# Patient Record
Sex: MALE | Race: WHITE | NOT HISPANIC OR LATINO
[De-identification: names, ages, dates, MRNs, and addresses within clinical notes are randomized per-mention and may not be internally consistent; named-entity substitution may affect disease eponyms.]

---

## 2017-04-11 ENCOUNTER — APPOINTMENT (OUTPATIENT)
Dept: DERMATOLOGY | Facility: CLINIC | Age: 82
End: 2017-04-11

## 2017-04-11 VITALS — HEIGHT: 66 IN | BODY MASS INDEX: 23.95 KG/M2 | WEIGHT: 149 LBS

## 2017-04-11 DIAGNOSIS — Z85.828 PERSONAL HISTORY OF OTHER MALIGNANT NEOPLASM OF SKIN: ICD-10-CM

## 2017-04-11 DIAGNOSIS — C64.2 MALIGNANT NEOPLASM OF LEFT KIDNEY, EXCEPT RENAL PELVIS: ICD-10-CM

## 2017-04-11 DIAGNOSIS — Z86.39 PERSONAL HISTORY OF OTHER ENDOCRINE, NUTRITIONAL AND METABOLIC DISEASE: ICD-10-CM

## 2017-04-11 DIAGNOSIS — Z91.89 OTHER SPECIFIED PERSONAL RISK FACTORS, NOT ELSEWHERE CLASSIFIED: ICD-10-CM

## 2017-04-11 DIAGNOSIS — Z87.891 PERSONAL HISTORY OF NICOTINE DEPENDENCE: ICD-10-CM

## 2017-04-11 RX ORDER — RASAGILINE MESYLATE 1 MG/1
1 TABLET ORAL
Refills: 0 | Status: ACTIVE | COMMUNITY

## 2017-04-11 RX ORDER — MIRTAZAPINE 7.5 MG/1
7.5 TABLET, FILM COATED ORAL
Refills: 0 | Status: ACTIVE | COMMUNITY

## 2017-04-11 RX ORDER — SIMVASTATIN 20 MG/1
20 TABLET, FILM COATED ORAL
Refills: 0 | Status: ACTIVE | COMMUNITY

## 2017-04-11 RX ORDER — LEVODOPA AND CARBIDOPA 95; 23.75 MG/1; MG/1
23.75-95 CAPSULE, EXTENDED RELEASE ORAL
Refills: 0 | Status: ACTIVE | COMMUNITY

## 2017-04-11 RX ORDER — MIRTAZAPINE 30 MG/1
30 TABLET, ORALLY DISINTEGRATING ORAL
Refills: 0 | Status: ACTIVE | COMMUNITY

## 2017-04-11 RX ORDER — CHROMIUM 200 MCG
1000 TABLET ORAL
Refills: 0 | Status: ACTIVE | COMMUNITY

## 2017-04-11 RX ORDER — VALACYCLOVIR HYDROCHLORIDE 1 G/1
1 TABLET, FILM COATED ORAL
Refills: 0 | Status: ACTIVE | COMMUNITY

## 2017-04-11 RX ORDER — TAMSULOSIN HYDROCHLORIDE 0.4 MG/1
0.4 CAPSULE ORAL
Refills: 0 | Status: ACTIVE | COMMUNITY

## 2017-04-11 RX ORDER — ASPIRIN 81 MG
81 TABLET, DELAYED RELEASE (ENTERIC COATED) ORAL
Refills: 0 | Status: ACTIVE | COMMUNITY

## 2017-04-11 RX ORDER — DONEPEZIL HYDROCHLORIDE 5 MG/1
5 TABLET, FILM COATED ORAL
Refills: 0 | Status: ACTIVE | COMMUNITY

## 2017-04-11 RX ORDER — LOSARTAN POTASSIUM 50 MG/1
50 TABLET, FILM COATED ORAL
Refills: 0 | Status: ACTIVE | COMMUNITY

## 2017-11-21 ENCOUNTER — EMERGENCY (EMERGENCY)
Facility: HOSPITAL | Age: 82
LOS: 0 days | Discharge: HOME | End: 2017-11-21
Attending: EMERGENCY MEDICINE | Admitting: EMERGENCY MEDICINE
Payer: MEDICARE

## 2017-11-21 VITALS
RESPIRATION RATE: 16 BRPM | OXYGEN SATURATION: 98 % | DIASTOLIC BLOOD PRESSURE: 74 MMHG | SYSTOLIC BLOOD PRESSURE: 135 MMHG | HEART RATE: 62 BPM

## 2017-11-21 VITALS
HEIGHT: 68 IN | DIASTOLIC BLOOD PRESSURE: 85 MMHG | RESPIRATION RATE: 18 BRPM | HEART RATE: 64 BPM | TEMPERATURE: 98 F | OXYGEN SATURATION: 99 % | SYSTOLIC BLOOD PRESSURE: 192 MMHG | WEIGHT: 164.91 LBS

## 2017-11-21 DIAGNOSIS — N40.0 BENIGN PROSTATIC HYPERPLASIA WITHOUT LOWER URINARY TRACT SYMPTOMS: ICD-10-CM

## 2017-11-21 DIAGNOSIS — R94.31 ABNORMAL ELECTROCARDIOGRAM [ECG] [EKG]: ICD-10-CM

## 2017-11-21 DIAGNOSIS — E78.5 HYPERLIPIDEMIA, UNSPECIFIED: ICD-10-CM

## 2017-11-21 DIAGNOSIS — Z87.19 PERSONAL HISTORY OF OTHER DISEASES OF THE DIGESTIVE SYSTEM: ICD-10-CM

## 2017-11-21 DIAGNOSIS — R07.9 CHEST PAIN, UNSPECIFIED: ICD-10-CM

## 2017-11-21 DIAGNOSIS — Z98.890 OTHER SPECIFIED POSTPROCEDURAL STATES: ICD-10-CM

## 2017-11-21 DIAGNOSIS — G20 PARKINSON'S DISEASE: ICD-10-CM

## 2017-11-21 DIAGNOSIS — H26.9 UNSPECIFIED CATARACT: ICD-10-CM

## 2017-11-21 DIAGNOSIS — Z90.5 ACQUIRED ABSENCE OF KIDNEY: ICD-10-CM

## 2017-11-21 DIAGNOSIS — E11.9 TYPE 2 DIABETES MELLITUS WITHOUT COMPLICATIONS: ICD-10-CM

## 2017-11-21 DIAGNOSIS — F43.20 ADJUSTMENT DISORDER, UNSPECIFIED: ICD-10-CM

## 2017-11-21 LAB
ALBUMIN SERPL ELPH-MCNC: 3.9 G/DL — SIGNIFICANT CHANGE UP (ref 3.3–5)
ALP SERPL-CCNC: 103 U/L — SIGNIFICANT CHANGE UP (ref 40–120)
ALT FLD-CCNC: 14 U/L — SIGNIFICANT CHANGE UP (ref 12–78)
ANION GAP SERPL CALC-SCNC: 6 MMOL/L — SIGNIFICANT CHANGE UP (ref 5–17)
AST SERPL-CCNC: 17 U/L — SIGNIFICANT CHANGE UP (ref 15–37)
BASOPHILS # BLD AUTO: 0.1 K/UL — SIGNIFICANT CHANGE UP (ref 0–0.2)
BASOPHILS NFR BLD AUTO: 1.6 % — SIGNIFICANT CHANGE UP (ref 0–2)
BILIRUB SERPL-MCNC: 0.5 MG/DL — SIGNIFICANT CHANGE UP (ref 0.2–1.2)
BUN SERPL-MCNC: 25 MG/DL — HIGH (ref 7–23)
CALCIUM SERPL-MCNC: 9.2 MG/DL — SIGNIFICANT CHANGE UP (ref 8.5–10.1)
CHLORIDE SERPL-SCNC: 104 MMOL/L — SIGNIFICANT CHANGE UP (ref 96–108)
CO2 SERPL-SCNC: 30 MMOL/L — SIGNIFICANT CHANGE UP (ref 22–31)
CREAT SERPL-MCNC: 1.34 MG/DL — HIGH (ref 0.5–1.3)
EOSINOPHIL # BLD AUTO: 0.2 K/UL — SIGNIFICANT CHANGE UP (ref 0–0.5)
EOSINOPHIL NFR BLD AUTO: 3.1 % — SIGNIFICANT CHANGE UP (ref 0–6)
GLUCOSE SERPL-MCNC: 129 MG/DL — HIGH (ref 70–99)
HCT VFR BLD CALC: 38.6 % — LOW (ref 39–50)
HGB BLD-MCNC: 12.5 G/DL — LOW (ref 13–17)
INR BLD: 1.12 RATIO — SIGNIFICANT CHANGE UP (ref 0.88–1.16)
LYMPHOCYTES # BLD AUTO: 1.4 K/UL — SIGNIFICANT CHANGE UP (ref 1–3.3)
LYMPHOCYTES # BLD AUTO: 22.9 % — SIGNIFICANT CHANGE UP (ref 13–44)
MCHC RBC-ENTMCNC: 32.4 GM/DL — SIGNIFICANT CHANGE UP (ref 32–36)
MCHC RBC-ENTMCNC: 33.4 PG — SIGNIFICANT CHANGE UP (ref 27–34)
MCV RBC AUTO: 102.9 FL — HIGH (ref 80–100)
MONOCYTES # BLD AUTO: 0.3 K/UL — SIGNIFICANT CHANGE UP (ref 0–0.9)
MONOCYTES NFR BLD AUTO: 5.3 % — SIGNIFICANT CHANGE UP (ref 2–14)
NEUTROPHILS # BLD AUTO: 4.1 K/UL — SIGNIFICANT CHANGE UP (ref 1.8–7.4)
NEUTROPHILS NFR BLD AUTO: 67.1 % — SIGNIFICANT CHANGE UP (ref 43–77)
PLATELET # BLD AUTO: 137 K/UL — LOW (ref 150–400)
POTASSIUM SERPL-MCNC: 4.6 MMOL/L — SIGNIFICANT CHANGE UP (ref 3.5–5.3)
POTASSIUM SERPL-SCNC: 4.6 MMOL/L — SIGNIFICANT CHANGE UP (ref 3.5–5.3)
PROT SERPL-MCNC: 6.8 GM/DL — SIGNIFICANT CHANGE UP (ref 6–8.3)
PROTHROM AB SERPL-ACNC: 12.1 SEC — SIGNIFICANT CHANGE UP (ref 9.8–12.7)
RBC # BLD: 3.75 M/UL — LOW (ref 4.2–5.8)
RBC # FLD: 10.8 % — SIGNIFICANT CHANGE UP (ref 10.3–14.5)
SODIUM SERPL-SCNC: 140 MMOL/L — SIGNIFICANT CHANGE UP (ref 135–145)
TROPONIN I SERPL-MCNC: <0.015 NG/ML — SIGNIFICANT CHANGE UP (ref 0.01–0.04)
TROPONIN I SERPL-MCNC: <0.015 NG/ML — SIGNIFICANT CHANGE UP (ref 0.01–0.04)
WBC # BLD: 6.1 K/UL — SIGNIFICANT CHANGE UP (ref 3.8–10.5)
WBC # FLD AUTO: 6.1 K/UL — SIGNIFICANT CHANGE UP (ref 3.8–10.5)

## 2017-11-21 PROCEDURE — 99285 EMERGENCY DEPT VISIT HI MDM: CPT | Mod: 25

## 2017-11-21 PROCEDURE — 93010 ELECTROCARDIOGRAM REPORT: CPT

## 2017-11-21 PROCEDURE — 71010: CPT | Mod: 26

## 2017-11-21 RX ORDER — SODIUM CHLORIDE 9 MG/ML
3 INJECTION INTRAMUSCULAR; INTRAVENOUS; SUBCUTANEOUS ONCE
Qty: 0 | Refills: 0 | Status: COMPLETED | OUTPATIENT
Start: 2017-11-21 | End: 2017-11-21

## 2017-11-21 RX ADMIN — SODIUM CHLORIDE 3 MILLILITER(S): 9 INJECTION INTRAMUSCULAR; INTRAVENOUS; SUBCUTANEOUS at 08:55

## 2017-11-21 NOTE — ED PROVIDER NOTE - OBJECTIVE STATEMENT
Pt comes to the Ed complainign of chest pain. Pt states that the pain woke him from sleep and was sharp. As per patient the pain was right sided and also TTP of the chest wall. No cough no fevers. Pt states called 911. En route felt better, no meds given to him. Pt states the pain lasted approx 20 mins. Now only TTP of the right chest wall. No dyspnea.

## 2017-11-21 NOTE — ED PROVIDER NOTE - NS_ ATTENDINGSCRIBEDETAILS _ED_A_ED_FT
I, Kit Clarke MD,  performed the initial face to face bedside interview with this patient regarding history of present illness, review of symptoms and relevant past medical, social and family history.  I completed an independent physical examination.    The history, relevant review of systems, past medical and surgical history, medical decision making, and physical examination was documented by the scribe in my presence and I attest to the accuracy of the documentation.

## 2017-11-21 NOTE — ED ADULT NURSE NOTE - OBJECTIVE STATEMENT
Pt woke up with chest pain with breathing which went away by the time EMS arrived. Pt denies pain now

## 2017-11-21 NOTE — ED PROVIDER NOTE - PSH
Adjustment Reaction with Anxiety and Depression    Bilateral Cataracts    Enlarged Prostate    History of Nephrectomy, Unilateral    S/P Inguinal Hernia Repair

## 2017-12-12 ENCOUNTER — APPOINTMENT (OUTPATIENT)
Dept: DERMATOLOGY | Facility: CLINIC | Age: 82
End: 2017-12-12
Payer: MEDICARE

## 2017-12-12 DIAGNOSIS — D23.9 OTHER BENIGN NEOPLASM OF SKIN, UNSPECIFIED: ICD-10-CM

## 2017-12-12 PROCEDURE — 99213 OFFICE O/P EST LOW 20 MIN: CPT

## 2017-12-12 RX ORDER — PREDNISOLONE SODIUM PHOSPHATE 10 MG/ML
1 SOLUTION/ DROPS OPHTHALMIC
Refills: 0 | Status: COMPLETED | COMMUNITY
End: 2017-12-12

## 2017-12-12 RX ORDER — MIRABEGRON 25 MG/1
25 TABLET, FILM COATED, EXTENDED RELEASE ORAL
Refills: 0 | Status: COMPLETED | COMMUNITY
End: 2017-12-12

## 2017-12-12 RX ORDER — ROPINIROLE 6 MG/1
6 TABLET, FILM COATED, EXTENDED RELEASE ORAL
Refills: 0 | Status: COMPLETED | COMMUNITY
End: 2017-12-12

## 2017-12-12 RX ORDER — PREDNISONE 5 MG
5 TABLET, DOSE PACK ORAL
Refills: 0 | Status: COMPLETED | COMMUNITY
End: 2017-12-12

## 2017-12-12 RX ORDER — LATANOPROST/PF 0.005 %
DROPS OPHTHALMIC (EYE)
Refills: 0 | Status: ACTIVE | COMMUNITY

## 2018-07-25 ENCOUNTER — INPATIENT (INPATIENT)
Facility: HOSPITAL | Age: 83
LOS: 1 days | Discharge: HOME | End: 2018-07-27
Attending: INTERNAL MEDICINE | Admitting: INTERNAL MEDICINE
Payer: MEDICARE

## 2018-07-25 VITALS
DIASTOLIC BLOOD PRESSURE: 82 MMHG | TEMPERATURE: 97 F | HEART RATE: 70 BPM | WEIGHT: 149.91 LBS | SYSTOLIC BLOOD PRESSURE: 192 MMHG | RESPIRATION RATE: 15 BRPM | OXYGEN SATURATION: 98 % | HEIGHT: 67 IN

## 2018-07-25 LAB
ADD ON TEST-SPECIMEN IN LAB: SIGNIFICANT CHANGE UP
ALBUMIN SERPL ELPH-MCNC: 4.1 G/DL — SIGNIFICANT CHANGE UP (ref 3.3–5)
ALP SERPL-CCNC: 112 U/L — SIGNIFICANT CHANGE UP (ref 40–120)
ALT FLD-CCNC: 13 U/L — SIGNIFICANT CHANGE UP (ref 12–78)
AMMONIA BLD-MCNC: 15 UMOL/L — SIGNIFICANT CHANGE UP (ref 11–32)
ANION GAP SERPL CALC-SCNC: 5 MMOL/L — SIGNIFICANT CHANGE UP (ref 5–17)
APPEARANCE UR: CLEAR — SIGNIFICANT CHANGE UP
APTT BLD: 30.1 SEC — SIGNIFICANT CHANGE UP (ref 27.5–37.4)
AST SERPL-CCNC: 17 U/L — SIGNIFICANT CHANGE UP (ref 15–37)
BASOPHILS # BLD AUTO: 0.04 K/UL — SIGNIFICANT CHANGE UP (ref 0–0.2)
BASOPHILS NFR BLD AUTO: 0.8 % — SIGNIFICANT CHANGE UP (ref 0–2)
BILIRUB SERPL-MCNC: 0.5 MG/DL — SIGNIFICANT CHANGE UP (ref 0.2–1.2)
BILIRUB UR-MCNC: NEGATIVE — SIGNIFICANT CHANGE UP
BUN SERPL-MCNC: 27 MG/DL — HIGH (ref 7–23)
CALCIUM SERPL-MCNC: 9.1 MG/DL — SIGNIFICANT CHANGE UP (ref 8.5–10.1)
CHLORIDE SERPL-SCNC: 105 MMOL/L — SIGNIFICANT CHANGE UP (ref 96–108)
CK SERPL-CCNC: 80 U/L — SIGNIFICANT CHANGE UP (ref 26–308)
CO2 SERPL-SCNC: 30 MMOL/L — SIGNIFICANT CHANGE UP (ref 22–31)
COLOR SPEC: YELLOW — SIGNIFICANT CHANGE UP
CREAT SERPL-MCNC: 1.4 MG/DL — HIGH (ref 0.5–1.3)
DIFF PNL FLD: NEGATIVE — SIGNIFICANT CHANGE UP
EOSINOPHIL # BLD AUTO: 0.12 K/UL — SIGNIFICANT CHANGE UP (ref 0–0.5)
EOSINOPHIL NFR BLD AUTO: 2.3 % — SIGNIFICANT CHANGE UP (ref 0–6)
GLUCOSE SERPL-MCNC: 128 MG/DL — HIGH (ref 70–99)
GLUCOSE UR QL: NEGATIVE MG/DL — SIGNIFICANT CHANGE UP
HCT VFR BLD CALC: 41 % — SIGNIFICANT CHANGE UP (ref 39–50)
HGB BLD-MCNC: 13.6 G/DL — SIGNIFICANT CHANGE UP (ref 13–17)
IMM GRANULOCYTES NFR BLD AUTO: 0.2 % — SIGNIFICANT CHANGE UP (ref 0–1.5)
INR BLD: 1.04 RATIO — SIGNIFICANT CHANGE UP (ref 0.88–1.16)
KETONES UR-MCNC: NEGATIVE — SIGNIFICANT CHANGE UP
LACTATE SERPL-SCNC: 1.6 MMOL/L — SIGNIFICANT CHANGE UP (ref 0.7–2)
LEUKOCYTE ESTERASE UR-ACNC: NEGATIVE — SIGNIFICANT CHANGE UP
LYMPHOCYTES # BLD AUTO: 1.18 K/UL — SIGNIFICANT CHANGE UP (ref 1–3.3)
LYMPHOCYTES # BLD AUTO: 23 % — SIGNIFICANT CHANGE UP (ref 13–44)
MCHC RBC-ENTMCNC: 33.2 GM/DL — SIGNIFICANT CHANGE UP (ref 32–36)
MCHC RBC-ENTMCNC: 33.9 PG — SIGNIFICANT CHANGE UP (ref 27–34)
MCV RBC AUTO: 102.2 FL — HIGH (ref 80–100)
MONOCYTES # BLD AUTO: 0.36 K/UL — SIGNIFICANT CHANGE UP (ref 0–0.9)
MONOCYTES NFR BLD AUTO: 7 % — SIGNIFICANT CHANGE UP (ref 2–14)
NEUTROPHILS # BLD AUTO: 3.43 K/UL — SIGNIFICANT CHANGE UP (ref 1.8–7.4)
NEUTROPHILS NFR BLD AUTO: 66.7 % — SIGNIFICANT CHANGE UP (ref 43–77)
NITRITE UR-MCNC: NEGATIVE — SIGNIFICANT CHANGE UP
PH UR: 6 — SIGNIFICANT CHANGE UP (ref 5–8)
PLATELET # BLD AUTO: 166 K/UL — SIGNIFICANT CHANGE UP (ref 150–400)
POTASSIUM SERPL-MCNC: 4.5 MMOL/L — SIGNIFICANT CHANGE UP (ref 3.5–5.3)
POTASSIUM SERPL-SCNC: 4.5 MMOL/L — SIGNIFICANT CHANGE UP (ref 3.5–5.3)
PROT SERPL-MCNC: 7.1 GM/DL — SIGNIFICANT CHANGE UP (ref 6–8.3)
PROT UR-MCNC: 30 MG/DL
PROTHROM AB SERPL-ACNC: 11.2 SEC — SIGNIFICANT CHANGE UP (ref 9.8–12.7)
RBC # BLD: 4.01 M/UL — LOW (ref 4.2–5.8)
RBC # FLD: 11.2 % — SIGNIFICANT CHANGE UP (ref 10.3–14.5)
SODIUM SERPL-SCNC: 140 MMOL/L — SIGNIFICANT CHANGE UP (ref 135–145)
SP GR SPEC: 1.01 — SIGNIFICANT CHANGE UP (ref 1.01–1.02)
TROPONIN I SERPL-MCNC: <0.015 NG/ML — SIGNIFICANT CHANGE UP (ref 0.01–0.04)
UROBILINOGEN FLD QL: NEGATIVE MG/DL — SIGNIFICANT CHANGE UP
WBC # BLD: 5.14 K/UL — SIGNIFICANT CHANGE UP (ref 3.8–10.5)
WBC # FLD AUTO: 5.14 K/UL — SIGNIFICANT CHANGE UP (ref 3.8–10.5)

## 2018-07-25 PROCEDURE — 71045 X-RAY EXAM CHEST 1 VIEW: CPT | Mod: 26

## 2018-07-25 PROCEDURE — 70450 CT HEAD/BRAIN W/O DYE: CPT | Mod: 26

## 2018-07-25 PROCEDURE — 93010 ELECTROCARDIOGRAM REPORT: CPT | Mod: 76

## 2018-07-25 PROCEDURE — 99285 EMERGENCY DEPT VISIT HI MDM: CPT

## 2018-07-25 RX ORDER — ASPIRIN/CALCIUM CARB/MAGNESIUM 324 MG
81 TABLET ORAL DAILY
Qty: 0 | Refills: 0 | Status: DISCONTINUED | OUTPATIENT
Start: 2018-07-25 | End: 2018-07-27

## 2018-07-25 RX ORDER — SODIUM CHLORIDE 9 MG/ML
1000 INJECTION, SOLUTION INTRAVENOUS
Qty: 0 | Refills: 0 | Status: DISCONTINUED | OUTPATIENT
Start: 2018-07-25 | End: 2018-07-27

## 2018-07-25 RX ORDER — CARBIDOPA AND LEVODOPA 25; 100 MG/1; MG/1
2 TABLET ORAL
Qty: 0 | Refills: 0 | Status: DISCONTINUED | OUTPATIENT
Start: 2018-07-25 | End: 2018-07-27

## 2018-07-25 RX ORDER — HEPARIN SODIUM 5000 [USP'U]/ML
5000 INJECTION INTRAVENOUS; SUBCUTANEOUS EVERY 8 HOURS
Qty: 0 | Refills: 0 | Status: DISCONTINUED | OUTPATIENT
Start: 2018-07-25 | End: 2018-07-27

## 2018-07-25 RX ORDER — CARBIDOPA AND LEVODOPA 25; 100 MG/1; MG/1
4 TABLET ORAL
Qty: 0 | Refills: 0 | COMMUNITY

## 2018-07-25 RX ORDER — CARBIDOPA AND LEVODOPA 25; 100 MG/1; MG/1
4 TABLET ORAL
Qty: 0 | Refills: 0 | Status: DISCONTINUED | OUTPATIENT
Start: 2018-07-25 | End: 2018-07-27

## 2018-07-25 RX ORDER — CARBIDOPA AND LEVODOPA 25; 100 MG/1; MG/1
1 TABLET ORAL
Qty: 0 | Refills: 0 | COMMUNITY

## 2018-07-25 RX ORDER — LATANOPROST 0.05 MG/ML
1 SOLUTION/ DROPS OPHTHALMIC; TOPICAL AT BEDTIME
Qty: 0 | Refills: 0 | Status: DISCONTINUED | OUTPATIENT
Start: 2018-07-25 | End: 2018-07-27

## 2018-07-25 RX ORDER — ESCITALOPRAM OXALATE 10 MG/1
10 TABLET, FILM COATED ORAL DAILY
Qty: 0 | Refills: 0 | Status: DISCONTINUED | OUTPATIENT
Start: 2018-07-25 | End: 2018-07-27

## 2018-07-25 RX ORDER — MIRTAZAPINE 45 MG/1
30 TABLET, ORALLY DISINTEGRATING ORAL AT BEDTIME
Qty: 0 | Refills: 0 | Status: DISCONTINUED | OUTPATIENT
Start: 2018-07-25 | End: 2018-07-27

## 2018-07-25 RX ORDER — DONEPEZIL HYDROCHLORIDE 10 MG/1
1 TABLET, FILM COATED ORAL
Qty: 0 | Refills: 0 | COMMUNITY

## 2018-07-25 RX ORDER — DONEPEZIL HYDROCHLORIDE 10 MG/1
5 TABLET, FILM COATED ORAL AT BEDTIME
Qty: 0 | Refills: 0 | Status: DISCONTINUED | OUTPATIENT
Start: 2018-07-25 | End: 2018-07-27

## 2018-07-25 RX ORDER — RASAGILINE 0.5 MG/1
1 TABLET ORAL DAILY
Qty: 0 | Refills: 0 | Status: DISCONTINUED | OUTPATIENT
Start: 2018-07-25 | End: 2018-07-27

## 2018-07-25 RX ORDER — TAMSULOSIN HYDROCHLORIDE 0.4 MG/1
1 CAPSULE ORAL
Qty: 0 | Refills: 0 | COMMUNITY

## 2018-07-25 RX ORDER — VALACYCLOVIR 500 MG/1
1 TABLET, FILM COATED ORAL
Qty: 0 | Refills: 0 | COMMUNITY

## 2018-07-25 RX ORDER — CARBIDOPA AND LEVODOPA 25; 100 MG/1; MG/1
3 TABLET ORAL
Qty: 0 | Refills: 0 | Status: DISCONTINUED | OUTPATIENT
Start: 2018-07-25 | End: 2018-07-27

## 2018-07-25 RX ORDER — TAMSULOSIN HYDROCHLORIDE 0.4 MG/1
0.4 CAPSULE ORAL AT BEDTIME
Qty: 0 | Refills: 0 | Status: DISCONTINUED | OUTPATIENT
Start: 2018-07-25 | End: 2018-07-27

## 2018-07-25 RX ORDER — VALACYCLOVIR 500 MG/1
500 TABLET, FILM COATED ORAL DAILY
Qty: 0 | Refills: 0 | Status: DISCONTINUED | OUTPATIENT
Start: 2018-07-25 | End: 2018-07-27

## 2018-07-25 RX ORDER — CHOLECALCIFEROL (VITAMIN D3) 125 MCG
1000 CAPSULE ORAL DAILY
Qty: 0 | Refills: 0 | Status: DISCONTINUED | OUTPATIENT
Start: 2018-07-25 | End: 2018-07-27

## 2018-07-25 RX ORDER — LATANOPROST 0.05 MG/ML
1 SOLUTION/ DROPS OPHTHALMIC; TOPICAL
Qty: 0 | Refills: 0 | COMMUNITY

## 2018-07-25 RX ORDER — CARBIDOPA AND LEVODOPA 25; 100 MG/1; MG/1
13 TABLET ORAL
Qty: 0 | Refills: 0 | COMMUNITY

## 2018-07-25 RX ORDER — DOCUSATE SODIUM 100 MG
100 CAPSULE ORAL THREE TIMES A DAY
Qty: 0 | Refills: 0 | Status: DISCONTINUED | OUTPATIENT
Start: 2018-07-25 | End: 2018-07-27

## 2018-07-25 RX ORDER — SIMVASTATIN 20 MG/1
20 TABLET, FILM COATED ORAL AT BEDTIME
Qty: 0 | Refills: 0 | Status: DISCONTINUED | OUTPATIENT
Start: 2018-07-25 | End: 2018-07-27

## 2018-07-25 RX ORDER — ASPIRIN/CALCIUM CARB/MAGNESIUM 324 MG
1 TABLET ORAL
Qty: 0 | Refills: 0 | COMMUNITY

## 2018-07-25 RX ORDER — RIVAROXABAN 15 MG-20MG
15 KIT ORAL ONCE
Qty: 0 | Refills: 0 | Status: COMPLETED | OUTPATIENT
Start: 2018-07-25 | End: 2018-07-25

## 2018-07-25 RX ADMIN — CARBIDOPA AND LEVODOPA 3 CAPSULE(S): 25; 100 TABLET ORAL at 20:53

## 2018-07-25 RX ADMIN — Medication 100 MILLIGRAM(S): at 21:25

## 2018-07-25 RX ADMIN — SODIUM CHLORIDE 60 MILLILITER(S): 9 INJECTION, SOLUTION INTRAVENOUS at 23:02

## 2018-07-25 RX ADMIN — DONEPEZIL HYDROCHLORIDE 5 MILLIGRAM(S): 10 TABLET, FILM COATED ORAL at 21:25

## 2018-07-25 RX ADMIN — Medication 1000 UNIT(S): at 21:24

## 2018-07-25 NOTE — H&P ADULT - HISTORY OF PRESENT ILLNESS
84 y/o male with a PMHx of Parkinson's disease/dementuia, HTN, HLD, DM presents to the ED for AMS. As per son at bedside, pt woke up this morning and was not acting himself.Patient was last seen to be at his baseline was yesterday afternoon as per son . Notes he is more "withdrawn." Lives with family. As per son, pt is not being cooperative with family. not speaking/interactive/weak.. Son reported increased urinary frequency .  history derived from son at bedside, patient unreliable/poor  historian due to parkinson's disease and dmentia    Family hx- as per son no pertinent family hx of cardiac or neurological problems   social hx- quit smoking  and etoh use several decades ago    ROS: Denies fever, n/v/d, CP, sob , no dysuria, no abdominal pain ,no palpitations .

## 2018-07-25 NOTE — ED PROVIDER NOTE - MEDICAL DECISION MAKING DETAILS
86 y/o male with hx Parkinson's disease, HTN, DM, presents to ED c/o acute changes in mental status. As per son, pt became less responsive and not as conversant as normal. Pt now A&O x2 c/o some dysuria. Concern for delirium secondary to infection. Will obtain labs, urine, CT head, cultures. Likely to admit for further workup.

## 2018-07-25 NOTE — H&P ADULT - NSHPLABSRESULTS_GEN_ALL_CORE
13.6   5.14  )-----------( 166      ( 2018 10:24 )             41.0       CBC Full  -  ( 2018 10:24 )  WBC Count : 5.14 K/uL  Hemoglobin : 13.6 g/dL  Hematocrit : 41.0 %  Platelet Count - Automated : 166 K/uL  Mean Cell Volume : 102.2 fl  Mean Cell Hemoglobin : 33.9 pg  Mean Cell Hemoglobin Concentration : 33.2 gm/dL  Auto Neutrophil # : 3.43 K/uL  Auto Lymphocyte # : 1.18 K/uL  Auto Monocyte # : 0.36 K/uL  Auto Eosinophil # : 0.12 K/uL  Auto Basophil # : 0.04 K/uL  Auto Neutrophil % : 66.7 %  Auto Lymphocyte % : 23.0 %  Auto Monocyte % : 7.0 %  Auto Eosinophil % : 2.3 %  Auto Basophil % : 0.8 %          140  |  105  |  27<H>  ----------------------------<  128<H>  4.5   |  30  |  1.40<H>    Ca    9.1      2018 10:24    TPro  7.1  /  Alb  4.1  /  TBili  0.5  /  DBili  x   /  AST  17  /  ALT  13  /  AlkPhos  112  25      LIVER FUNCTIONS - ( 2018 10:24 )  Alb: 4.1 g/dL / Pro: 7.1 gm/dL / ALK PHOS: 112 U/L / ALT: 13 U/L / AST: 17 U/L / GGT: x             PT/INR - ( 2018 10:24 )   PT: 11.2 sec;   INR: 1.04 ratio         PTT - ( 2018 10:24 )  PTT:30.1 sec    CARDIAC MARKERS ( 2018 10:24 )  <0.015 ng/mL / x     / 80 U/L / x     / x            Urinalysis Basic - ( 2018 10:24 )    Color: Yellow / Appearance: Clear / S.010 / pH: x  Gluc: x / Ketone: Negative  / Bili: Negative / Urobili: Negative mg/dL   Blood: x / Protein: 30 mg/dL / Nitrite: Negative   Leuk Esterase: Negative / RBC: Negative /HPF / WBC Negative   Sq Epi: x / Non Sq Epi: Negative / Bacteria: Negative            MEDICATIONS  (STANDING):  carbidopa 36.25 mG/levodopa 145 mG ER 4 Capsule(s) Oral <User Schedule>  carbidopa 36.25 mG/levodopa 145 mG ER 3 Capsule(s) Oral <User Schedule>  carbidopa 36.25 mG/levodopa 145 mG ER 2 Capsule(s) Oral <User Schedule>

## 2018-07-25 NOTE — H&P ADULT - NSHPPHYSICALEXAM_GEN_ALL_CORE
PHYSICAL EXAM:    Daily Height in cm: 170.18 (25 Jul 2018 09:47)    Daily     ICU Vital Signs Last 24 Hrs  T(C): 36.7 (25 Jul 2018 15:15), Max: 37.2 (25 Jul 2018 10:20)  T(F): 98 (25 Jul 2018 15:15), Max: 99 (25 Jul 2018 10:20)  HR: 82 (25 Jul 2018 15:15) (70 - 89)  BP: 134/81 (25 Jul 2018 15:15) (134/81 - 192/82)  BP(mean): --  ABP: --  ABP(mean): --  RR: 16 (25 Jul 2018 15:15) (15 - 19)  SpO2: 98% (25 Jul 2018 15:15) (98% - 99%)      Constitutional: NAD  HEENT: Atraumatic, CARIE, Normal, No congestion  Respiratory: Breath Sounds normal, no rhonchi/wheeze  Cardiovascular: N S1S2;   Gastrointestinal: Abdomen soft, non tender, Bowel Ssounds present  Extremities: 1+ pedal edema  Neurological: awake, alert oriented to person, year 2017, month july, place hospital, follows one step command, upper extremity motor 4/5 with involuntary tremor, B/l lower extremities motor 3/5  Skin: Non cellulitic,    Back: No CVA tenderness     Breasts: Deferred  Genitourinary: deferred  Rectal: Deferred

## 2018-07-25 NOTE — PATIENT PROFILE ADULT. - TEACHING/LEARNING LEARNING PREFERENCES
audio/pictorial/skill demonstration/group instruction/written material/individual instruction/verbal instruction

## 2018-07-25 NOTE — H&P ADULT - ASSESSMENT
84 y/o male with a PMHx of Parkinson's disease/dementuia, HTN, HLD, DM presents to the ED for AMS. As per son at bedside, pt woke up this morning and was not acting himself.Patient was last seen to be at his baseline was yesterday afternoon as per son . Notes he is more "withdrawn." Lives with family. As per son, pt is not being cooperative with family. not speaking/interactive/weak.. Son reported increased urinary frequency .  history derived from son at bedside, patient unreliable/poor  historian due to parkinson's disease and dmentia    #Delirium  DDx acute encephalopathy from worsening parkinson's disease vs deconditioning vs metabolic causes vs r/o subacute stroke vs seizure  -admit to tele  - will do MRI brain r/o subacute stroke and will order eeg  -will order urine cx, vit B12, TSH, ammonia to r/o other metabolic causes of delirium  -CXR- no acute pathology  continue parkinsons disease  meds  neuro consult    #hx CKD stage 3 ,now cr slightly worse than in nov 2017, r/o LIZBETH on CKD stage 3   bladder scan   slow IVF    #abnormal EKG , initial EKG was suspicious of afib, repeat EKG shows p waves   I dicussed with Dr maryanne Ye will hold thereputic  anticoagulation for now  monitor tele  will do echo      #DVT prophylaxis  high risk improve VTE score  will do heparin SC and IPC 86 y/o male with a PMHx of Parkinson's disease/dementuia, HTN, HLD, DM presents to the ED for AMS. As per son at bedside, pt woke up this morning and was not acting himself.Patient was last seen to be at his baseline was yesterday afternoon as per son . Notes he is more "withdrawn." Lives with family. As per son, pt is not being cooperative with family. not speaking/interactive/weak.. Son reported increased urinary frequency .  history derived from son at bedside, patient unreliable/poor  historian due to parkinson's disease and dmentia    #Delirium  DDx acute encephalopathy from worsening parkinson's disease vs deconditioning vs metabolic causes vs r/o subacute stroke vs seizure  -admit to tele  - will do MRI brain r/o subacute stroke and will order eeg  -will order urine cx, vit B12, TSH, ammonia to r/o other metabolic causes of delirium  -CXR- no acute pathology  continue parkinsons disease  meds  neuro consult    #hx CKD stage 3 ,now cr slightly worse than in nov 2017, r/o LIZBETH on CKD stage 3   bladder scan   slow IVF, will hold losaratan for tonight   repeat BMP in am if stable would consider resuming losartan in am    #abnormal EKG , initial EKG was suspicious of afib, repeat EKG shows p waves   I dicussed with Dr maryanne Ye will hold thereputic  anticoagulation for now  monitor tele  will do echo      #DVT prophylaxis  high risk improve VTE score  will do heparin SC and IPC 84 y/o male with a PMHx of Parkinson's disease/dementuia, HTN, HLD, DM presents to the ED for AMS. As per son at bedside, pt woke up this morning and was not acting himself.Patient was last seen to be at his baseline was yesterday afternoon as per son . Notes he is more "withdrawn." Lives with family. As per son, pt is not being cooperative with family. not speaking/interactive/weak.. Son reported increased urinary frequency .  history derived from son at bedside, patient unreliable/poor  historian due to parkinson's disease and dmentia    #Delirium  DDx acute encephalopathy from worsening parkinson's disease vs deconditioning vs metabolic causes vs r/o subacute stroke vs seizure  -admit to tele  - will do MRI brain r/o subacute stroke and will order eeg  -will order urine cx, vit B12, TSH, ammonia to r/o other metabolic causes of delirium  -CXR- no acute pathology  continue parkinsons disease  meds  neuro consult    #hx CKD stage 3 ,now cr slightly worse than in nov 2017, r/o LIZBETH on CKD stage 3   bladder scan   slow IVF, will hold losaratan for tonight   repeat BMP in am if stable would consider resuming losartan in am    #abnormal EKG , initial EKG was suspicious of afib, repeat EKG shows p waves , sinus rhythm with APCs  I dicussed with Dr maryanne Ye will hold thereputic  anticoagulation for now  monitor tele  will do echo      #DVT prophylaxis  high risk- improve VTE score  will do heparin SC and IPC 84 y/o male with a PMHx of Parkinson's disease/dementuia, HTN, HLD, DM presents to the ED for AMS. As per son at bedside, pt woke up this morning and was not acting himself.Patient was last seen to be at his baseline was yesterday afternoon as per son . Notes he is more "withdrawn." Lives with family. As per son, pt is not being cooperative with family. not speaking/interactive/weak.. Son reported increased urinary frequency .  history derived from son at bedside, patient unreliable/poor  historian due to parkinson's disease and dmentia    #Delirium  DDx acute encephalopathy from worsening parkinson's disease vs deconditioning vs metabolic causes vs r/o subacute stroke vs seizure  -admit to tele  - will do MRI brain r/o subacute stroke and will order eeg  -will order urine cx, vit B12, TSH, ammonia to r/o other metabolic causes of delirium  -UA not impressive for UTI,denies dysuria, no fever  -CXR- no acute pathology  continue parkinsons disease  meds  neuro consult    #hx CKD stage 3 ,now cr slightly worse than in nov 2017, r/o LIZBETH on CKD stage 3   bladder scan   slow IVF, will hold losaratan for tonight   repeat BMP in am if stable would consider resuming losartan in am    #abnormal EKG , initial EKG was suspicious of afib, repeat EKG shows p waves , sinus rhythm with APCs  I dicussed with Dr maryanne Ye will hold thereputic  anticoagulation for now  monitor tele  will do echo      #DVT prophylaxis  high risk- improve VTE score  will do heparin SC and IPC 84 y/o male with a PMHx of Parkinson's disease/dementuia, HTN, HLD, DM presents to the ED for AMS. As per son at bedside, pt woke up this morning and was not acting himself.Patient was last seen to be at his baseline was yesterday afternoon as per son . Notes he is more "withdrawn." Lives with family. As per son, pt is not being cooperative with family. not speaking/interactive/weak.. Son reported increased urinary frequency .  history derived from son at bedside, patient unreliable/poor  historian due to parkinson's disease and dmentia    #Delirium  DDx acute encephalopathy from worsening parkinson's disease vs deconditioning vs metabolic causes vs r/o subacute stroke vs seizure  -admit to tele  - will do MRI brain r/o subacute stroke and will order eeg  -will order urine cx, vit B12, TSH, ammonia to r/o other metabolic causes of delirium  -UA not impressive for UTI,denies dysuria, no fever  -CXR- no acute pathology  continue parkinsons disease  meds  neuro consult    #hx CKD stage 3 ,now cr slightly worse than in nov 2017, r/o LIZBETH on CKD stage 3   bladder scan   slow IVF, will hold losaratan for tonight   repeat BMP in am if stable would consider resuming losartan in am    #abnormal EKG , initial EKG was suspicious of afib, repeat EKG shows p waves , sinus rhythm with APCs  I dicussed with Dr maryanne Ye will hold thereputic  anticoagulation for now  monitor tele  will do echo  patient was given xarelto in ed   will hold further therapeutic AC until afib is confirmed       #DVT prophylaxis  high risk- improve VTE score  will do heparin SC and IPC

## 2018-07-25 NOTE — CONSULT NOTE ADULT - SUBJECTIVE AND OBJECTIVE BOX
Patient is a 85y old  Male who presents with a chief complaint of lethargy    HPI:  85 year old man with progressively worsening Parkinson's disease who was brought in today by his family due to lethargy.  The EKG was read as atrial  fibrillation therefore cardiology consultation was requested.  the patient cannot provide any history due to dementia and confusion. His son's are present.     PAST MEDICAL & SURGICAL HISTORY:  RBBB and LAHB  HTN (hypertension)  Diabetes, type 2  Hyperlipidemia  Parkinson Disease with dementia  Adjustment Reaction with Anxiety and Depression  sleep apnea  CKD- stage 3  left corneal ulceration  possibly due to herpes zoster  Enlarged Prostate-     PSH:  Bilateral Cataracts   History of right  Nephrectomy, Unilateral for renal cell cancer   bilateral inguinal hernia repair  S/P Inguinal Hernia Repair  Out patient  meds:   Out patient meds:   ASA, simvastatin, Vit D, Azilect, donepezil  Rytary, Flomax, Myrbetriq, losartan, mirtazapine, escitalopram, Valtrex,Latanoprost  MEDICATIONS  (STANDING):  carbidopa 36.25 mG/levodopa 145 mG ER 4 Capsule(s) Oral <User Schedule>  carbidopa 36.25 mG/levodopa 145 mG ER 3 Capsule(s) Oral <User Schedule>  carbidopa 36.25 mG/levodopa 145 mG ER 2 Capsule(s) Oral <User Schedule>  rivaroxaban 15 milliGRAM(s) Oral once    MEDICATIONS  (PRN):      FAMILY HISTORY:      SOCIAL HISTORY:  Tobacco-     no      Alcohol-     former         Illicit drugs-        no      Occupation-   retired           Marital  status-  REVIEW OF SYSTEM:  Pertinent items are noted in HPI.    Vital Signs Last 24 Hrs  T(C): 36.7 (2018 15:15), Max: 37.2 (2018 10:20)  T(F): 98 (2018 15:15), Max: 99 (2018 10:20)  HR: 82 (2018 15:15) (70 - 89)  BP: 134/81 (2018 15:15) (134/81 - 192/82)  BP(mean): --  RR: 16 (2018 15:15) (15 - 19)  SpO2: 98% (2018 15:15) (98% - 99%)    I&O's Summary    PHYSICAL EXAM  General Appearance: Chronically ill  HEENT: PERRL, conjunctiva clear, EOM's intact, non injected pharynx, no exudate, TM   normal  Neck: Supple, , no adenopathy, thyroid: not enlarged, no carotid bruit or JVD  Back: Symmetric, no  tenderness,no soft tissue tenderness  Lungs: Clear to auscultation bilaterally,no adventitious breath sounds, normal   expiratory phase  Heart: Regular rate and rhythm, S1, S2 normal, no murmur, rub or gallop  Abdomen: Soft, non-tender, bowel sounds active , no hepatosplenomegaly  Extremities: 1-2+ edema both legs  Skin: Skin color, texture normal, no rashes   Neurologic: alert, confused, disoriented. Increased motor tone and motor retardation.         INTERPRETATION OF TELEMETRY:    EC.sinus 72 BPM with APCs, RBBB and LAHB. 2. sinus 71 BPM with APCs, RBBB and LAHB.  3. sinus bradycardia 59 BPM, RBBB and LAHB        LABS:                          13.6   5.14  )-----------( 166      ( 2018 10:24 )             41.0         140  |  105  |  27<H>  ----------------------------<  128<H>  4.5   |  30  |  1.40<H>    Ca    9.1      2018 10:24    TPro  7.1  /  Alb  4.1  /  TBili  0.5  /  DBili  x   /  AST  17  /  ALT  13  /  AlkPhos  112  07-25    CARDIAC MARKERS ( 2018 10:24 )  <0.015 ng/mL / x     / 80 U/L / x     / x            Pro BNP  221  @ 10:24  D Dimer  --  @ 10:24    PT/INR - ( 2018 10:24 )   PT: 11.2 sec;   INR: 1.04 ratio         PTT - ( 2018 10:24 )  PTT:30.1 sec  Urinalysis Basic - ( 2018 10:24 )    Color: Yellow / Appearance: Clear / S.010 / pH: x  Gluc: x / Ketone: Negative  / Bili: Negative / Urobili: Negative mg/dL   Blood: x / Protein: 30 mg/dL / Nitrite: Negative   Leuk Esterase: Negative / RBC: Negative /HPF / WBC Negative   Sq Epi: x / Non Sq Epi: Negative / Bacteria: Negative            RADIOLOGY & ADDITIONAL STUDIES:    IMPRESSION:  1. Sinus rhythm with APCs and not atrial fibrillation. 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  2, Acute alteration of mental status may have been due to meds, Parkinson's disease, Parkinson's meds,  acute stroke, undiagnosed urinary infection.   3,Parkinsons with chronic dementia  4.Hypertension. Stable  PLAN:  Observe on telemetry. Consider MRI brain and neurology evaluation. Consider urine culture. No indication for oral anticoagulation. Continue losartan 25 mg qd, ASA 81 mg qd, simvastatin 20 mg qd. Will follow.

## 2018-07-25 NOTE — ED ADULT NURSE REASSESSMENT NOTE - NS ED NURSE REASSESS COMMENT FT1
repeat EKG completed as ordered by Cardio and Dr. Gunderson at bedside. No anticoagulant needed at this time. VSS. Pending admission orders. Son at bedside, updated of POC. Will continue monitoring patient.

## 2018-07-25 NOTE — ED ADULT NURSE NOTE - MUSCULOSKELETAL WDL
History of Parkinsons, tremors noted over b/l upper extremities. Bilateral +2 pitting edema noted to lower extremities.

## 2018-07-25 NOTE — ED PROVIDER NOTE - NS_ ATTENDINGSCRIBEDETAILS _ED_A_ED_FT
I, Erick Gómez MD,  performed the initial face to face bedside interview with this patient regarding history of present illness, review of symptoms and relevant past medical, social and family history.  I completed an independent physical examination.  I was the initial provider who evaluated this patient.  The history, relevant review of systems, past medical and surgical history, medical decision making, and physical examination was documented by the scribe in my presence and I attest to the accuracy of the documentation.

## 2018-07-25 NOTE — ED PROVIDER NOTE - NS ED ROS FT
Constitutional: No fever or chills  Eyes: No visual changes  HEENT: No throat pain  CV: No chest pain  Resp: No SOB no cough  GI: No abd pain, nausea or vomiting  : + dysuria  MSK: No musculoskeletal pain  Skin: No rash  Neuro: No headache + AMS

## 2018-07-25 NOTE — ED PROVIDER NOTE - PROGRESS NOTE DETAILS
spoke with Dr. Concepcion states that patient has never been in afib before. wants patient to be started on xarelto. agrees with admission. Erick Gómez M.D., Attending Physician extensive conversation with patient and family. patient with new onset afib. pt ams and unable to fully understand medical condition - states he wants to go home but he is unable to make this medical decision. son at bedside is NOT health care proxy - proxy is not currently here. I explained that if the proxy wants to sign out ama she needs to be in contact in order to fully explain the seriousness of the patients medical condition. Erick Gómez M.D., Attending Physician extensive conversation with patient and family. patient with new onset afib. pt ams and unable to fully understand medical condition - states he wants to go home but he is unable to make this medical decision. son at bedside is NOT health care proxy - proxy is not currently here. I explained that if the proxy wants to sign out ama she needs to be in contact in order to fully explain the seriousness of the patients medical condition. will admit now for A/C and family knows that we will be happy to explain risks/benefits to proxy when available and they can decide if they want to signout ama. Erick Gómez M.D., Attending Physician

## 2018-07-25 NOTE — ED PROVIDER NOTE - OBJECTIVE STATEMENT
84 y/o male with a PMHx of Parkinson's disease, HTN, HLD, DM presents to the ED c/o AMS. As per son at bedside, pt woke up this morning and was not acting himself. Notes he is more "withdrawn." Lives with family. As per son, pt is not being cooperative with family. +dysuria. Denies fever, n/v/d, CP. Former smoker.  PCP: Dr. Concepcion. 84 y/o male with a PMHx of Parkinson's disease, HTN, HLD, DM presents to the ED c/o AMS. As per son at bedside, pt woke up this morning and was not acting himself. Notes he is more "withdrawn." Lives with family. As per son, pt is not being cooperative with family. not speaking/interactive/weak.+dysuria. Denies fever, n/v/d, CP. Former smoker.  PCP: Dr. Concepcion.

## 2018-07-25 NOTE — ED ADULT NURSE NOTE - OBJECTIVE STATEMENT
Pt presented to ED after being brought in by family for episode of AMS this morning. Per son at bedside, pt has not been acting like his baseline and refuses to cooperate. Pt states "I have to get away from him" while pointing to son. Tremors noted to b/l upper extremities; history of Parkinson's. Upon arrival, immediately placed on tele monitor and spO2 monitoring. Dr. Gómez at bedside. 12 lead ekg performed. Pending radiological exams. Safety/fall precautions in place, red nonslip socks on pt.

## 2018-07-25 NOTE — ED ADULT NURSE NOTE - GENITOURINARY WDL
Bladder non-tender and non-distended. Pt is incontinent to urine and bowel movements but able to let toileting needs known to staff.

## 2018-07-25 NOTE — ED PROVIDER NOTE - PHYSICAL EXAMINATION
B/L 2+ pitting edema. Constitutional: mild distress AAOx2  Eyes: PERRLA EOMI  Head: Normocephalic atraumatic  Mouth: MMM  Cardiac: regular rate  B/L 2+ pitting edema. normal peripheral pulses  Resp: Lungs CTAB  GI: Abd s/nt/nd  Neuro: CN2-12 intact  Skin: No rashes

## 2018-07-26 DIAGNOSIS — G20 PARKINSON'S DISEASE: ICD-10-CM

## 2018-07-26 DIAGNOSIS — R41.82 ALTERED MENTAL STATUS, UNSPECIFIED: ICD-10-CM

## 2018-07-26 PROBLEM — Z85.828 HISTORY OF BASAL CELL CARCINOMA: Status: RESOLVED | Noted: 2017-04-11 | Resolved: 2018-07-26

## 2018-07-26 LAB
ANION GAP SERPL CALC-SCNC: 6 MMOL/L — SIGNIFICANT CHANGE UP (ref 5–17)
BASOPHILS # BLD AUTO: 0.06 K/UL — SIGNIFICANT CHANGE UP (ref 0–0.2)
BASOPHILS NFR BLD AUTO: 0.8 % — SIGNIFICANT CHANGE UP (ref 0–2)
BUN SERPL-MCNC: 23 MG/DL — SIGNIFICANT CHANGE UP (ref 7–23)
CALCIUM SERPL-MCNC: 9 MG/DL — SIGNIFICANT CHANGE UP (ref 8.5–10.1)
CHLORIDE SERPL-SCNC: 108 MMOL/L — SIGNIFICANT CHANGE UP (ref 96–108)
CO2 SERPL-SCNC: 29 MMOL/L — SIGNIFICANT CHANGE UP (ref 22–31)
CREAT SERPL-MCNC: 1.14 MG/DL — SIGNIFICANT CHANGE UP (ref 0.5–1.3)
CULTURE RESULTS: NO GROWTH — SIGNIFICANT CHANGE UP
EOSINOPHIL # BLD AUTO: 0.14 K/UL — SIGNIFICANT CHANGE UP (ref 0–0.5)
EOSINOPHIL NFR BLD AUTO: 1.9 % — SIGNIFICANT CHANGE UP (ref 0–6)
GLUCOSE SERPL-MCNC: 132 MG/DL — HIGH (ref 70–99)
HCT VFR BLD CALC: 38.8 % — LOW (ref 39–50)
HGB BLD-MCNC: 13.2 G/DL — SIGNIFICANT CHANGE UP (ref 13–17)
IMM GRANULOCYTES NFR BLD AUTO: 0.1 % — SIGNIFICANT CHANGE UP (ref 0–1.5)
LYMPHOCYTES # BLD AUTO: 1.53 K/UL — SIGNIFICANT CHANGE UP (ref 1–3.3)
LYMPHOCYTES # BLD AUTO: 21 % — SIGNIFICANT CHANGE UP (ref 13–44)
MAGNESIUM SERPL-MCNC: 2.2 MG/DL — SIGNIFICANT CHANGE UP (ref 1.6–2.6)
MCHC RBC-ENTMCNC: 33.5 PG — SIGNIFICANT CHANGE UP (ref 27–34)
MCHC RBC-ENTMCNC: 34 GM/DL — SIGNIFICANT CHANGE UP (ref 32–36)
MCV RBC AUTO: 98.5 FL — SIGNIFICANT CHANGE UP (ref 80–100)
MONOCYTES # BLD AUTO: 0.63 K/UL — SIGNIFICANT CHANGE UP (ref 0–0.9)
MONOCYTES NFR BLD AUTO: 8.7 % — SIGNIFICANT CHANGE UP (ref 2–14)
NEUTROPHILS # BLD AUTO: 4.91 K/UL — SIGNIFICANT CHANGE UP (ref 1.8–7.4)
NEUTROPHILS NFR BLD AUTO: 67.5 % — SIGNIFICANT CHANGE UP (ref 43–77)
NRBC # BLD: 0 /100 WBCS — SIGNIFICANT CHANGE UP (ref 0–0)
PHOSPHATE SERPL-MCNC: 2.4 MG/DL — LOW (ref 2.5–4.5)
PLATELET # BLD AUTO: 164 K/UL — SIGNIFICANT CHANGE UP (ref 150–400)
POTASSIUM SERPL-MCNC: 3.9 MMOL/L — SIGNIFICANT CHANGE UP (ref 3.5–5.3)
POTASSIUM SERPL-SCNC: 3.9 MMOL/L — SIGNIFICANT CHANGE UP (ref 3.5–5.3)
RBC # BLD: 3.94 M/UL — LOW (ref 4.2–5.8)
RBC # FLD: 11.2 % — SIGNIFICANT CHANGE UP (ref 10.3–14.5)
SODIUM SERPL-SCNC: 143 MMOL/L — SIGNIFICANT CHANGE UP (ref 135–145)
SPECIMEN SOURCE: SIGNIFICANT CHANGE UP
TSH SERPL-MCNC: 2.4 UU/ML — SIGNIFICANT CHANGE UP (ref 0.34–4.82)
VIT B12 SERPL-MCNC: 377 PG/ML — SIGNIFICANT CHANGE UP (ref 232–1245)
WBC # BLD: 7.28 K/UL — SIGNIFICANT CHANGE UP (ref 3.8–10.5)
WBC # FLD AUTO: 7.28 K/UL — SIGNIFICANT CHANGE UP (ref 3.8–10.5)

## 2018-07-26 PROCEDURE — 99222 1ST HOSP IP/OBS MODERATE 55: CPT

## 2018-07-26 PROCEDURE — 90792 PSYCH DIAG EVAL W/MED SRVCS: CPT

## 2018-07-26 PROCEDURE — 93306 TTE W/DOPPLER COMPLETE: CPT | Mod: 26

## 2018-07-26 PROCEDURE — 93010 ELECTROCARDIOGRAM REPORT: CPT

## 2018-07-26 RX ORDER — HYDRALAZINE HCL 50 MG
10 TABLET ORAL ONCE
Qty: 0 | Refills: 0 | Status: COMPLETED | OUTPATIENT
Start: 2018-07-26 | End: 2018-07-26

## 2018-07-26 RX ORDER — HALOPERIDOL DECANOATE 100 MG/ML
0.5 INJECTION INTRAMUSCULAR EVERY 6 HOURS
Qty: 0 | Refills: 0 | Status: DISCONTINUED | OUTPATIENT
Start: 2018-07-26 | End: 2018-07-26

## 2018-07-26 RX ORDER — LOSARTAN POTASSIUM 100 MG/1
25 TABLET, FILM COATED ORAL DAILY
Qty: 0 | Refills: 0 | Status: DISCONTINUED | OUTPATIENT
Start: 2018-07-27 | End: 2018-07-27

## 2018-07-26 RX ORDER — HALOPERIDOL DECANOATE 100 MG/ML
0.25 INJECTION INTRAMUSCULAR EVERY 6 HOURS
Qty: 0 | Refills: 0 | Status: DISCONTINUED | OUTPATIENT
Start: 2018-07-26 | End: 2018-07-27

## 2018-07-26 RX ADMIN — SIMVASTATIN 20 MILLIGRAM(S): 20 TABLET, FILM COATED ORAL at 22:42

## 2018-07-26 RX ADMIN — CARBIDOPA AND LEVODOPA 2 CAPSULE(S): 25; 100 TABLET ORAL at 18:47

## 2018-07-26 RX ADMIN — MIRTAZAPINE 30 MILLIGRAM(S): 45 TABLET, ORALLY DISINTEGRATING ORAL at 22:42

## 2018-07-26 RX ADMIN — CARBIDOPA AND LEVODOPA 2 CAPSULE(S): 25; 100 TABLET ORAL at 00:20

## 2018-07-26 RX ADMIN — VALACYCLOVIR 500 MILLIGRAM(S): 500 TABLET, FILM COATED ORAL at 12:33

## 2018-07-26 RX ADMIN — TAMSULOSIN HYDROCHLORIDE 0.4 MILLIGRAM(S): 0.4 CAPSULE ORAL at 22:42

## 2018-07-26 RX ADMIN — Medication 1000 UNIT(S): at 12:33

## 2018-07-26 RX ADMIN — Medication 100 MILLIGRAM(S): at 14:09

## 2018-07-26 RX ADMIN — HEPARIN SODIUM 5000 UNIT(S): 5000 INJECTION INTRAVENOUS; SUBCUTANEOUS at 22:43

## 2018-07-26 RX ADMIN — Medication 10 MILLIGRAM(S): at 00:33

## 2018-07-26 RX ADMIN — Medication 100 MILLIGRAM(S): at 22:43

## 2018-07-26 RX ADMIN — DONEPEZIL HYDROCHLORIDE 5 MILLIGRAM(S): 10 TABLET, FILM COATED ORAL at 22:42

## 2018-07-26 RX ADMIN — Medication 81 MILLIGRAM(S): at 12:33

## 2018-07-26 RX ADMIN — RASAGILINE 1 MILLIGRAM(S): 0.5 TABLET ORAL at 12:32

## 2018-07-26 RX ADMIN — HEPARIN SODIUM 5000 UNIT(S): 5000 INJECTION INTRAVENOUS; SUBCUTANEOUS at 14:09

## 2018-07-26 RX ADMIN — HALOPERIDOL DECANOATE 0.5 MILLIGRAM(S): 100 INJECTION INTRAMUSCULAR at 12:23

## 2018-07-26 RX ADMIN — CARBIDOPA AND LEVODOPA 4 CAPSULE(S): 25; 100 TABLET ORAL at 10:40

## 2018-07-26 RX ADMIN — CARBIDOPA AND LEVODOPA 3 CAPSULE(S): 25; 100 TABLET ORAL at 15:20

## 2018-07-26 RX ADMIN — ESCITALOPRAM OXALATE 10 MILLIGRAM(S): 10 TABLET, FILM COATED ORAL at 12:33

## 2018-07-26 NOTE — BEHAVIORAL HEALTH ASSESSMENT NOTE - NSBHCHARTREVIEWLAB_PSY_A_CORE FT
13.2   7.28  )-----------( 164      ( 26 Jul 2018 06:03 )             38.8   07-26    143  |  108  |  23  ----------------------------<  132<H>  3.9   |  29  |  1.14    Ca    9.0      26 Jul 2018 06:03  Phos  2.4     07-26  Mg     2.2     07-26    TPro  7.1  /  Alb  4.1  /  TBili  0.5  /  DBili  x   /  AST  17  /  ALT  13  /  AlkPhos  112  07-25

## 2018-07-26 NOTE — BEHAVIORAL HEALTH ASSESSMENT NOTE - DIFFERENTIAL
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

## 2018-07-26 NOTE — BEHAVIORAL HEALTH ASSESSMENT NOTE - HPI (INCLUDE ILLNESS QUALITY, SEVERITY, DURATION, TIMING, CONTEXT, MODIFYING FACTORS, ASSOCIATED SIGNS AND SYMPTOMS)
Pt is an 85 YOWM with Parkinson's disease and dementia due to PD, experiencing some agitation I the light of hospitalization. Per RN he was hallucinating this AM  (could be side effects of antiparkinson parvin )   Per son pt started being combative last few days and even physical with his sons.   Pt talks incoherently, asking where he is. He is disoriented  in place, time and situation.   he is not cooperative, refuses to answer the questions about his mod, si, HI, AH and VH.   Pt is followed by Dr Wilson in community and family is refusing any psychotropic meds adjustment at this time.

## 2018-07-26 NOTE — CONSULT NOTE ADULT - SUBJECTIVE AND OBJECTIVE BOX
Neurology Consult requested by:   Patient is a 85y old  Male who presents with a chief complaint of confusion (25 Jul 2018 18:15)     HPI:  86 y/o male with a PMHx of Parkinson's disease/dementuia, HTN, HLD, DM presents to the ED for AMS. Hx a s per chart, reportedly  son noted, pt was not acting himself. Patient was last seen to be at his baseline was yesterday afternoon as per son . Notes he is more "withdrawn." Lives with family. As per son, pt is not being cooperative with family. not speaking/interactive/weak.. Son reported increased urinary frequency .  Presently patient denies headache, dizziness, unilateral weakness, no pain or N/V.  Wants the constable, planning going to [place]. Looking for his glasses.    Family hx- as per son no pertinent family hx of cardiac or neurological problems   social hx- quit smoking  and etoh use several decades ago    ROS: Denies fever, n/v/d, CP, sob , no dysuria, no abdominal pain ,no palpitations . (25 Jul 2018 18:15)      PAST MEDICAL & SURGICAL HISTORY:  HTN (hypertension)  Diabetes  Hyperlipidemia  Parkinson Disease  Adjustment Reaction with Anxiety and Depression  Enlarged Prostate  Bilateral Cataracts  History of Nephrectomy, Unilateral  S/P Inguinal Hernia Repair    FAMILY HISTORY:    Social Hx:  Nonsmoker, no drug or alcohol use  Medications and Allergies ReviewedMEDICATIONS  (STANDING):  aspirin enteric coated 81 milliGRAM(s) Oral daily  carbidopa 36.25 mG/levodopa 145 mG ER 4 Capsule(s) Oral <User Schedule>  carbidopa 36.25 mG/levodopa 145 mG ER 3 Capsule(s) Oral <User Schedule>  carbidopa 36.25 mG/levodopa 145 mG ER 2 Capsule(s) Oral <User Schedule>  cholecalciferol 1000 Unit(s) Oral daily  docusate sodium 100 milliGRAM(s) Oral three times a day  donepezil 5 milliGRAM(s) Oral at bedtime  escitalopram 10 milliGRAM(s) Oral daily  heparin  Injectable 5000 Unit(s) SubCutaneous every 8 hours  latanoprost 0.005% Ophthalmic Solution 1 Drop(s) Right EYE at bedtime  mirtazapine 30 milliGRAM(s) Oral at bedtime  rasagiline Tablet 1 milliGRAM(s) Oral daily  simvastatin 20 milliGRAM(s) Oral at bedtime  sodium chloride 0.45%. 1000 milliLiter(s) (60 mL/Hr) IV Continuous <Continuous>  tamsulosin Oral Tab/Cap - Peds 0.4 milliGRAM(s) Oral at bedtime  valACYclovir 500 milliGRAM(s) Oral daily     ROS: Pertinent positives in HPI, all other ROS were reviewed and are negative.      Examination:   Vital Signs Last 24 Hrs  T(C): 36.4 (26 Jul 2018 06:33), Max: 37.2 (25 Jul 2018 10:20)  T(F): 97.5 (26 Jul 2018 06:33), Max: 99 (25 Jul 2018 10:20)  HR: 99 (26 Jul 2018 06:33) (59 - 99)  BP: 156/52 (26 Jul 2018 06:33) (134/81 - 192/82)  BP(mean): --  RR: 18 (26 Jul 2018 06:33) (15 - 19)  SpO2: 96% (26 Jul 2018 06:33) (96% - 99%)  General: Cooperative, NAD   NECK: supple, no masses  ENT: Normal hearing   Vascular : no carotid bruits,   Lungs: CTAB  Chest: RRR, no murmurs  Extremities: nontender, no edema  Musculoskeletal: no adventitious movements, + joint stiffness  Skin: no rash    Neurological Examination:  NIHSS:  MS: AOx1. interactive, flat affect. Speech fluent w/o paraphasic error, names follows 2 step commands   CN: PERLL, EOMI, V1-3 sensation intact, face symmetric, hearing intact, palate elevates symmetrically, tongue midline, SCM equal bilaterally  Motor: normal bulk, + rigidity, mild bradykinesia.  ~5/5 all over   Sens: Intact to light touch.    Reflexes: 1-2/4 all over, downgoing toes b/l  Coord:  No dysmetria   Gait: Cannot test    Labs: Reviewed  Complete Blood Count + Automated Diff (07.26.18 @ 06:03)    WBC Count: 7.28 K/uL    RBC Count: 3.94 M/uL    Hemoglobin: 13.2 g/dL    Hematocrit: 38.8 %    Mean Cell Volume: 98.5 fl    Mean Cell Hemoglobin: 33.5 pg    Mean Cell Hemoglobin Conc: 34.0 gm/dL    Red Cell Distrib Width: 11.2 %    Platelet Count - Automated: 164 K/uL    Basic Metabolic Panel (07.26.18 @ 06:03)    Sodium, Serum: 143 mmol/L    Potassium, Serum: 3.9 mmol/L    Chloride, Serum: 108 mmol/L    Carbon Dioxide, Serum: 29 mmol/L    Anion Gap, Serum: 6 mmol/L    Blood Urea Nitrogen, Serum: 23 mg/dL    Creatinine, Serum: 1.14 mg/dL    Glucose, Serum: 132 mg/dL    Calcium, Total Serum: 9.0 mg/dL    eGFR if Non : 58: Interpretative comment    Thyroid Stimulating Hormone, Serum: 2.40 uU/mL (07.26.18 @ 06:03)    Ammonia, Serum: 15 umol/L (07.25.18 @ 22:32)    Imaging:  < from: CT Head No Cont (07.25.18 @ 10:47) >  INTERPRETATION:  Exam Date: 7/25/2018 10:47 AM    CT head without IV contrast    CLINICAL INFORMATION:  ams        TECHNIQUE: Contiguous axial sections were obtained through the head.     This scan was performed using automatic exposure control (radiation dose   reduction software) to obtain a diagnostic image quality scan with   patient dose as low as reasonably achievable.      COMPARISON: No previous examinationsare available for review.     FINDINGS:     There is no evidence of intraparenchymal or extraaxial hemorrhage.     There is no CT evidence of large vessel acute infarct. No mass effect is   found in the brain.  No evidence of midline shift or herniation pattern.  The ventricles, sulci and basal cisterns appear unremarkable.       Visualized paranasal sinuses are clear.    IMPRESSION:   No acute intracranial findings.

## 2018-07-26 NOTE — PROGRESS NOTE ADULT - ASSESSMENT
84 y/o male with a PMHx of Parkinson's disease/dementuia, HTN, HLD, DM presents to the ED for AMS. As per son at bedside, pt woke up this morning and was not acting himself.Patient was last seen to be at his baseline was yesterday afternoon as per son . Notes he is more "withdrawn." Lives with family. As per son, pt is not being cooperative with family. not speaking/interactive/weak.. Son reported increased urinary frequency .  history derived from son at bedside, patient unreliable/poor historian due to parkinson's disease and dmentia    # Acute on Chronic Encephalopathy - Suspected Worsening Parkinson's Disease  - no signs of infection, UA negative, afebrile.   - CT head w/o hemorrhage.   - attempted MRI again today w/ sedation but patient not tolerating exam, also limited from kyphosis and cannot lay flat.   - no metabolic causes / unable to rule out subacute stroke however without focal motor deficits, discussed w/ neuro.   - refused EEG but doubtful to be seizure as patient awake and mentating just difficult w/ family and staff.   - consult Psych for input regarding meds for discharge as family wishes for patient to be dc'd home eventually with them.   - consult PT for ambulation.   - pending B12, folate, RPR. Normal TSH.   -CXR- no acute pathology  continue parkinsons disease meds  neuro consult appreciated.     #hx CKD stage 3 ,now cr slightly worse than in nov 2017, r/o LIZBETH on CKD stage 3   bladder scan   slow IVF, will hold losaratan for tonight   repeat BMP in am if stable would consider resuming losartan in am    #abnormal EKG , initial EKG was suspicious of afib, repeat EKG shows p waves , sinus rhythm with APCs  I dicussed with Dr maryanne Ye will hold thereputic  anticoagulation for now  monitor tele  will do echo  patient was given xarelto in ed   will hold further therapeutic AC until afib is confirmed     #DVT prophylaxis  high risk- improve VTE score  will do heparin SC and IPC 86 y/o male with a PMHx of Parkinson's disease/dementuia, HTN, HLD, DM presents to the ED for AMS. As per son at bedside, pt woke up this morning and was not acting himself.Patient was last seen to be at his baseline was yesterday afternoon as per son . Notes he is more "withdrawn." Lives with family. As per son, pt is not being cooperative with family. not speaking/interactive/weak.. Son reported increased urinary frequency .  history derived from son at bedside, patient unreliable/poor historian due to parkinson's disease and dmentia    # Acute on Chronic Encephalopathy - Suspected Worsening Parkinson's Disease  - no signs of infection, UA negative, afebrile.   - CT head w/o hemorrhage.   - attempted MRI again today w/ sedation but patient not tolerating exam, also limited from kyphosis and cannot lay flat.   - no metabolic causes / unable to rule out subacute stroke however without focal motor deficits, discussed w/ neuro.   - refused EEG but doubtful to be seizure as patient awake and mentating just difficult w/ family and staff.   - consult Psych for input regarding meds for discharge as family wishes for patient to be dc'd home eventually with them.   - consult PT for ambulation.   - pending B12, folate, RPR. Normal TSH.   -CXR- no acute pathology  continue parkinsons disease meds  neuro consult appreciated.     #Acute on Chronic CKD stage 3 -->pre-renal as kidney function improved s/p IVFs. Losartan held.   - now at baseline, dc renal US.     #abnormal EKG , initial EKG was suspicious of afib, repeat EKG shows p waves , sinus rhythm with APCs  - not in A. fib on tele --> no need for therapuetic AC, appreciate Cardio input.   - resume Losartan.     #DVT prophylaxis  high risk- improve VTE score  will do heparin SC and IPC    Dispo: remain inpatient on tele, PT consult. Discussed w/ SW. Need Psych input and dc planning.   Total time > 45 mins. Discussed w/ sons.

## 2018-07-26 NOTE — BEHAVIORAL HEALTH ASSESSMENT NOTE - NSBHCHARTREVIEWVS_PSY_A_CORE FT
Vital Signs Last 24 Hrs  T(C): 36.2 (26 Jul 2018 10:39), Max: 36.7 (26 Jul 2018 00:57)  T(F): 97.2 (26 Jul 2018 10:39), Max: 98.1 (26 Jul 2018 00:57)  HR: 45 (26 Jul 2018 10:39) (45 - 99)  BP: 152/53 (26 Jul 2018 10:39) (147/46 - 185/58)  BP(mean): --  RR: 18 (26 Jul 2018 10:39) (18 - 18)  SpO2: 98% (26 Jul 2018 10:39) (96% - 98%)

## 2018-07-26 NOTE — BEHAVIORAL HEALTH ASSESSMENT NOTE - SUMMARY
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 He is not suicidal pr homicidal. AH that RN reports coud be side effects of antiparkinson meds vs delirium, vs dementia.   2 sons are present  at the bedside.   Pt si on 2 antidepressant, Suggested to simplify regiment, but family rejects any psychotropic meds adjustment or change.   Also, pt is on haldol PRN.   I would suggest to try Seroquel low dose 12.5 mg PO PRN q6 h rather than haldol IM , because pt has parkinson's disease and haldol is typical high potency D2 blocker.   however, if pt is agitate and danger to self and others and refuses PO meds, haldol low dose is good option.

## 2018-07-26 NOTE — BEHAVIORAL HEALTH ASSESSMENT NOTE - PROBLEM SELECTOR PLAN 1
suggested to d/c lexapro and lower remeron, but family is rejecting any psychotropic meds change.   Would also suggest low dose of Seroquel for delirium and AH, but family si rejecting.     While haldol is not the first choice agent ni parkinson's dementia dn agitation, and since pt is noncooperative and refuses meds, parenteral use of low dose of haldol 0.25 mg IM q 6 h prn severe agitation may be used.

## 2018-07-26 NOTE — BEHAVIORAL HEALTH ASSESSMENT NOTE - RISK ASSESSMENT
Pt is not suicidal or homicidal,  but if agitated he can engage I behavior with potentially painful consequences.

## 2018-07-26 NOTE — CONSULT NOTE ADULT - ASSESSMENT
85 year old man hx of Parkinson dx, dementia. confused, exam non focal. CT of head shows no acute findings, labs no obvious abnormalities. Most likely presentation due to underlying dementia, refusing EEG, ?CVA.

## 2018-07-27 ENCOUNTER — TRANSCRIPTION ENCOUNTER (OUTPATIENT)
Age: 83
End: 2018-07-27

## 2018-07-27 VITALS
TEMPERATURE: 98 F | HEART RATE: 58 BPM | DIASTOLIC BLOOD PRESSURE: 81 MMHG | SYSTOLIC BLOOD PRESSURE: 158 MMHG | RESPIRATION RATE: 18 BRPM | OXYGEN SATURATION: 96 %

## 2018-07-27 PROCEDURE — 93010 ELECTROCARDIOGRAM REPORT: CPT

## 2018-07-27 RX ADMIN — Medication 100 MILLIGRAM(S): at 06:21

## 2018-07-27 RX ADMIN — LATANOPROST 1 DROP(S): 0.05 SOLUTION/ DROPS OPHTHALMIC; TOPICAL at 03:40

## 2018-07-27 RX ADMIN — ESCITALOPRAM OXALATE 10 MILLIGRAM(S): 10 TABLET, FILM COATED ORAL at 12:19

## 2018-07-27 RX ADMIN — RASAGILINE 1 MILLIGRAM(S): 0.5 TABLET ORAL at 12:20

## 2018-07-27 RX ADMIN — HEPARIN SODIUM 5000 UNIT(S): 5000 INJECTION INTRAVENOUS; SUBCUTANEOUS at 06:22

## 2018-07-27 RX ADMIN — CARBIDOPA AND LEVODOPA 4 CAPSULE(S): 25; 100 TABLET ORAL at 06:34

## 2018-07-27 RX ADMIN — VALACYCLOVIR 500 MILLIGRAM(S): 500 TABLET, FILM COATED ORAL at 12:20

## 2018-07-27 RX ADMIN — LOSARTAN POTASSIUM 25 MILLIGRAM(S): 100 TABLET, FILM COATED ORAL at 06:21

## 2018-07-27 RX ADMIN — CARBIDOPA AND LEVODOPA 4 CAPSULE(S): 25; 100 TABLET ORAL at 10:54

## 2018-07-27 RX ADMIN — Medication 1000 UNIT(S): at 12:20

## 2018-07-27 RX ADMIN — Medication 81 MILLIGRAM(S): at 12:19

## 2018-07-27 NOTE — DISCHARGE NOTE ADULT - PLAN OF CARE
Follow up with outpatient neurologist. Recommend starting low dose seroquel at bedtime if patient is agitated at home. No signs of infection / urine infection.

## 2018-07-27 NOTE — DISCHARGE NOTE ADULT - MEDICATION SUMMARY - MEDICATIONS TO TAKE
I will START or STAY ON the medications listed below when I get home from the hospital:    aspirin 81 mg oral tablet  -- 1 tab(s) by mouth every other day  -- Indication: For Cardioprotective    losartan 25 mg oral tablet  -- 1 tab(s) by mouth once a day  -- Indication: For HTN (hypertension)    tamsulosin 0.4 mg oral capsule  -- 1 cap(s) by mouth once a day (at bedtime)  -- Indication: For BPH    mirtazapine 30 mg oral tablet  -- 1 tab(s) by mouth once a day (at bedtime)  -- Indication: For Antidepressant    escitalopram 10 mg oral tablet  -- 1 tab(s) by mouth once a day  -- Indication: For Anti-depressant    simvastatin 20 mg oral tablet  -- 1 tab(s) by mouth once a day (at bedtime)  -- Indication: For High cholesterol    rasagiline 1 mg oral tablet  -- 1 tab(s) by mouth once a day  -- Indication: For Anti-parkinson    Rytary 36.25 mg-145 mg oral capsule, extended release  -- 3 cap(s) by mouth once a day at 3 pm   -- Indication: For Parkinson Disease    Rytary 36.25 mg-145 mg oral capsule, extended release  -- 2 cap(s) by mouth once a day at 7 pm   -- Indication: For Parkinson Disease    Rytary 36.25 mg-145 mg oral capsule, extended release  -- 4 cap(s) by mouth 2 times a day at 7 am and 11 am   -- Indication: For Parkinson Disease    valACYclovir 500 mg oral tablet  -- 1 tab(s) by mouth once a day  -- Indication: For Anti-viral for shingles    donepezil 5 mg oral tablet  -- 1 tab(s) by mouth once a day  -- Indication: For Dementia due to Parkinson's disease with behavioral disturbance    docusate sodium 100 mg oral tablet  -- 1 tab(s) by mouth every 8 hours  -- Indication: For Constipation    latanoprost 0.005% ophthalmic solution  -- 1 drop(s) in the right eye once a day (in the evening)  -- Indication: For Eye drop    Vitamin D3 1000 intl units oral capsule  -- 1 cap(s) by mouth once a day  -- Indication: For Vit D

## 2018-07-27 NOTE — DISCHARGE NOTE ADULT - CARE PLAN
Principal Discharge DX:	Dementia due to Parkinson's disease with behavioral disturbance  Goal:	Follow up with outpatient neurologist.  Assessment and plan of treatment:	Recommend starting low dose seroquel at bedtime if patient is agitated at home. No signs of infection / urine infection.

## 2018-07-27 NOTE — DISCHARGE NOTE ADULT - OTHER SIGNIFICANT FINDINGS
Urine Microscopic-Add On (NC) (07.25.18 @ 10:24)    Bacteria: Negative    Epithelial Cells: Negative    Red Blood Cell - Urine: Negative /HPF    White Blood Cell - Urine: Negative    CT Head No Cont (07.25.18 @ 10:47)   No acute intracranial findings.

## 2018-07-27 NOTE — DISCHARGE NOTE ADULT - CARE PROVIDER_API CALL
Ephraim Pearl), Cardiovascular Disease; Internal Medicine  [address] [place] [address]  Phone: [phone]  Fax: [phone]

## 2018-07-27 NOTE — DISCHARGE NOTE ADULT - HOSPITAL COURSE
CC: Confusion    History of Present Illness: 	  84 y/o male with a PMHx of Parkinson's disease/dementia, HTN, HLD, DM presents to the ED for AMS. As per son at bedside, pt woke up and was not acting himself. Patient was last seen to be at his baseline the day prior. Notes he is more "withdrawn." Lives with family. As per son, pt is not being cooperative with family. not speaking/interactive/weak.  history derived from son at bedside, patient unreliable/poor  historian due to parkinson's disease and dementia    Subj 7/26: Rude to staff " leave me alone" and cusses. Denies CP / SOB. Sons at bedside, relay that patient with mental decline worsening for months. Patient refused EEG and refused MRI.     7/27: More calm, denies pain / CP / SOB or HA. Son at bedside, since did not tolerate Brain MRI and patient calm will dc home. Family refusing any further psych med adjustments and refuses home care.    ROS: neg unless stated above.    Vital Signs Last 24 Hrs  T(C): 36.4 (27 Jul 2018 11:21), Max: 36.4 (27 Jul 2018 11:21)  T(F): 97.6 (27 Jul 2018 11:21), Max: 97.6 (27 Jul 2018 11:21)  HR: 58 (27 Jul 2018 11:21) (54 - 58)  BP: 158/81 (27 Jul 2018 11:21) (153/61 - 158/81)  BP(mean): --  RR: 18 (27 Jul 2018 11:21) (18 - 19)  SpO2: 96% (27 Jul 2018 11:21) (96% - 99%)    PHYSICAL EXAM:  Constitutional: comfortable, frail appearing elderly male, awake and alert to self and location.   HEENT: PERR, EOMI, Normal Hearing, MMM  Neck: Soft and supple, No LAD, No JVD  Respiratory: Breath sounds are clear bilaterally, No wheezing, rales or rhonchi  Cardiovascular: S1 and S2, regular rate and rhythm.  Gastrointestinal: Bowel Sounds present, soft, nontender, nondistended, no guarding, no rebound  Extremities: No peripheral edema  Vascular: 2+ peripheral pulses  Neurological: A/O x 2, no focal deficits  Musculoskeletal: unable to test due to not cooperating however can move all limbs spontaneously.   Skin: No rashes    All meds/ images reviewed.     · Assessment		  84 y/o male with a PMHx of Parkinson's disease/dementuia, HTN, HLD, DM presents to the ED for AMS. As per son at bedside, pt woke up this morning and was not acting himself.Patient was last seen to be at his baseline was yesterday afternoon as per son . Notes he is more "withdrawn." Lives with family. As per son, pt is not being cooperative with family. not speaking/interactive/weak.. Son reported increased urinary frequency .  history derived from son at bedside, patient unreliable/poor historian due to parkinson's disease and dementia    # Acute on Chronic Encephalopathy - Suspected Worsening Parkinson's Disease  - no signs of infection, UA negative, afebrile.   - CT head w/o hemorrhage.   - attempted MRI X 2 w/ sedation but patient not tolerating exam, also limited from kyphosis and cannot lay flat.   - no metabolic causes / unable to rule out subacute stroke however without focal motor deficits thus low suspicion, discussed w/ neuro.   - refused EEG but doubtful to be seizure as patient awake and mentating just difficult w/ family and staff.   - appreciate Pyamanda carrion --> recommended to start low dose Seroquel QHS but family refused and wishes to f/u outpt with Dr Wilson, refuses home care.  - consult PT for ambulation.   - normal B12, folate, pending RPR. Normal TSH.   -CXR- no acute pathology  continue parkinsons disease meds  neuro consult appreciated.     #Acute on Chronic CKD stage 3 -->pre-renal as kidney function improved s/p IVFs. Losartan held.   - now at baseline, dc renal US.     #abnormal EKG , initial EKG was suspicious of afib, repeat EKG shows p waves , sinus rhythm with APCs  - not in A. fib on tele --> no need for therapuetic AC, appreciate Cardio input.   - resume Losartan.     # hx of left eye shingles - 2 years ago on daily valcyclovir, no signs of recurrent disease.     DC home w/ family today.   Total time > 45 mins. Discussed w/ son and SW.

## 2018-07-27 NOTE — DISCHARGE NOTE ADULT - PATIENT PORTAL LINK FT
You can access the [facility] Patient Portal, offered by [facility], by registering with the following website: [email or web address]

## 2018-07-27 NOTE — PROGRESS NOTE ADULT - SUBJECTIVE AND OBJECTIVE BOX
CC: Confusion    History of Present Illness: 	  86 y/o male with a PMHx of Parkinson's disease/dementia, HTN, HLD, DM presents to the ED for AMS. As per son at bedside, pt woke up and was not acting himself. Patient was last seen to be at his baseline the day prior. Notes he is more "withdrawn." Lives with family. As per son, pt is not being cooperative with family. not speaking/interactive/weak.  history derived from son at bedside, patient unreliable/poor  historian due to parkinson's disease and dementia    Subj: Rude to staff " leave me alone" and cusses. Denies CP / SOB. Sons at bedside, relay that patient with mental decline worsening for months. Patient refused EEG and refused MRI.     ROS: neg unless stated above.    Vital Signs Last 24 Hrs  T(C): 36.2 (26 Jul 2018 10:39), Max: 36.7 (25 Jul 2018 15:15)  T(F): 97.2 (26 Jul 2018 10:39), Max: 98.1 (26 Jul 2018 00:57)  HR: 45 (26 Jul 2018 10:39) (45 - 99)  BP: 152/53 (26 Jul 2018 10:39) (134/81 - 185/58)  BP(mean): --  RR: 18 (26 Jul 2018 10:39) (16 - 18)  SpO2: 98% (26 Jul 2018 10:39) (96% - 98%)    PHYSICAL EXAM:  Constitutional: mildly agitated frail appearing elderly male, awake and alert to self and location.   HEENT: PERR, EOMI, Normal Hearing, MMM  Neck: Soft and supple, No LAD, No JVD  Respiratory: Breath sounds are clear bilaterally, No wheezing, rales or rhonchi  Cardiovascular: S1 and S2, regular rate and rhythm.  Gastrointestinal: Bowel Sounds present, soft, nontender, nondistended, no guarding, no rebound  Extremities: No peripheral edema  Vascular: 2+ peripheral pulses  Neurological: A/O x 2, no focal deficits  Musculoskeletal: unable to test due to not cooperating however can move all limbs spontaneously.   Skin: No rashes    MEDICATIONS  (STANDING):  aspirin enteric coated 81 milliGRAM(s) Oral daily  carbidopa 36.25 mG/levodopa 145 mG ER 4 Capsule(s) Oral <User Schedule>  carbidopa 36.25 mG/levodopa 145 mG ER 3 Capsule(s) Oral <User Schedule>  carbidopa 36.25 mG/levodopa 145 mG ER 2 Capsule(s) Oral <User Schedule>  cholecalciferol 1000 Unit(s) Oral daily  docusate sodium 100 milliGRAM(s) Oral three times a day  donepezil 5 milliGRAM(s) Oral at bedtime  escitalopram 10 milliGRAM(s) Oral daily  heparin  Injectable 5000 Unit(s) SubCutaneous every 8 hours  latanoprost 0.005% Ophthalmic Solution 1 Drop(s) Right EYE at bedtime  mirtazapine 30 milliGRAM(s) Oral at bedtime  rasagiline Tablet 1 milliGRAM(s) Oral daily  simvastatin 20 milliGRAM(s) Oral at bedtime  sodium chloride 0.45%. 1000 milliLiter(s) (60 mL/Hr) IV Continuous <Continuous>  tamsulosin Oral Tab/Cap - Peds 0.4 milliGRAM(s) Oral at bedtime  valACYclovir 500 milliGRAM(s) Oral daily    LABS: All Labs Reviewed:                        13.2   7.28  )-----------( 164      ( 26 Jul 2018 06:03 )             38.8     07-26    143  |  108  |  23  ----------------------------<  132<H>  3.9   |  29  |  1.14    Ca    9.0      26 Jul 2018 06:03  Phos  2.4     07-26  Mg     2.2     07-26    TPro  7.1  /  Alb  4.1  /  TBili  0.5  /  DBili  x   /  AST  17  /  ALT  13  /  AlkPhos  112  07-25    PT/INR - ( 25 Jul 2018 10:24 )   PT: 11.2 sec;   INR: 1.04 ratio      PTT - ( 25 Jul 2018 10:24 )  PTT:30.1 sec  CARDIAC MARKERS ( 25 Jul 2018 10:24 )  <0.015 ng/mL / x     / 80 U/L / x     / x        Urine Microscopic-Add On (NC) (07.25.18 @ 10:24)    Bacteria: Negative    Epithelial Cells: Negative    Red Blood Cell - Urine: Negative /HPF    White Blood Cell - Urine: Negative    CT Head No Cont (07.25.18 @ 10:47)   No acute intracranial findings.
Patient is a 85y old  Male who presents with a chief complaint of confusion (2018 18:15)      HPI:  84 y/o male with a PMHx of Parkinson's disease/dementuia, HTN, HLD, DM presents to the ED for AMS. As per son at bedside, pt woke up this morning and was not acting himself.Patient was last seen to be at his baseline was yesterday afternoon as per son . Notes he is more "withdrawn." Lives with family. As per son, pt is not being cooperative with family. not speaking/interactive/weak.. Son reported increased urinary frequency .  history derived from son at bedside, patient unreliable/poor  historian due to parkinson's disease and dmentia    Family hx- as per son no pertinent family hx of cardiac or neurological problems   social hx- quit smoking  and etoh use several decades ago    ROS: Denies fever, n/v/d, CP, sob , no dysuria, no abdominal pain ,no palpitations . (2018 18:15)    7no complaints, confused and refuses telemetry  PAST MEDICAL & SURGICAL HISTORY:  HTN (hypertension)  Diabetes  Hyperlipidemia  Parkinson Disease  Adjustment Reaction with Anxiety and Depression  Enlarged Prostate  Bilateral Cataracts  History of Nephrectomy, Unilateral  S/P Inguinal Hernia Repair        MEDICATIONS  (STANDING):  aspirin enteric coated 81 milliGRAM(s) Oral daily  carbidopa 36.25 mG/levodopa 145 mG ER 4 Capsule(s) Oral <User Schedule>  carbidopa 36.25 mG/levodopa 145 mG ER 3 Capsule(s) Oral <User Schedule>  carbidopa 36.25 mG/levodopa 145 mG ER 2 Capsule(s) Oral <User Schedule>  cholecalciferol 1000 Unit(s) Oral daily  docusate sodium 100 milliGRAM(s) Oral three times a day  donepezil 5 milliGRAM(s) Oral at bedtime  escitalopram 10 milliGRAM(s) Oral daily  heparin  Injectable 5000 Unit(s) SubCutaneous every 8 hours  latanoprost 0.005% Ophthalmic Solution 1 Drop(s) Right EYE at bedtime  mirtazapine 30 milliGRAM(s) Oral at bedtime  rasagiline Tablet 1 milliGRAM(s) Oral daily  simvastatin 20 milliGRAM(s) Oral at bedtime  sodium chloride 0.45%. 1000 milliLiter(s) (60 mL/Hr) IV Continuous <Continuous>  tamsulosin Oral Tab/Cap - Peds 0.4 milliGRAM(s) Oral at bedtime  valACYclovir 500 milliGRAM(s) Oral daily    MEDICATIONS  (PRN):          Vital Signs Last 24 Hrs  T(C): 36.4 (2018 06:33), Max: 37.2 (2018 10:20)  T(F): 97.5 (2018 06:33), Max: 99 (2018 10:20)  HR: 99 (2018 06:33) (59 - 99)  BP: 156/52 (2018 06:33) (134/81 - 192/82)  BP(mean): --  RR: 18 (2018 06:33) (15 - 19)  SpO2: 96% (2018 06:33) (96% - 99%)    I&O's Summary    PHYSICAL EXAM  General Appearance: Chronically ill, not oriented to time or place  HEENT: PERRL, conjunctiva clear, EOM's intact, non injected pharynx, no exudate, TM   normal  Neck: Supple, , no adenopathy, thyroid: not enlarged, no carotid bruit or JVD  Back: Symmetric, no  tenderness,no soft tissue tenderness  Lungs: Clear to auscultation bilaterally,no adventitious breath sounds, normal   expiratory phase  Heart: Regular rate and rhythm, S1, S2 normal, no murmur, rub or gallop  Abdomen: Soft, non-tender, bowel sounds active , no hepatosplenomegaly  Extremities: 1-2+ edema both legs  Skin: Skin color, texture normal, no rashes   Neurologic: alert, confused, disoriented. Increased motor tone and motor retardation. INTERPRETATION OF TELEMETRY:    ECG: refuses telemetry leads        LABS:                          13.6   5.14  )-----------( 166      ( 2018 10:24 )             41.0     07-25    140  |  105  |  27<H>  ----------------------------<  128<H>  4.5   |  30  |  1.40<H>    Ca    9.1      2018 10:24    TPro  7.1  /  Alb  4.1  /  TBili  0.5  /  DBili  x   /  AST  17  /  ALT  13  /  AlkPhos  112  07-25    CARDIAC MARKERS ( 2018 10:24 )  <0.015 ng/mL / x     / 80 U/L / x     / x            Pro BNP  221  @ 10:24  D Dimer  --  @ 10:24    PT/INR - ( 2018 10:24 )   PT: 11.2 sec;   INR: 1.04 ratio         PTT - ( 2018 10:24 )  PTT:30.1 sec  Urinalysis Basic - ( 2018 10:24 )    Color: Yellow / Appearance: Clear / S.010 / pH: x  Gluc: x / Ketone: Negative  / Bili: Negative / Urobili: Negative mg/dL   Blood: x / Protein: 30 mg/dL / Nitrite: Negative   Leuk Esterase: Negative / RBC: Negative /HPF / WBC Negative   Sq Epi: x / Non Sq Epi: Negative / Bacteria: Negative          IMPRESSION:  1. Sinus rhythm with APCs and not atrial fibrillation. 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  2, Acute alteration of mental status may have been due to meds, Parkinson's disease, Parkinson's meds,  acute stroke, undiagnosed urinary infection.   3,Parkinsons with chronic dementia  4.Hypertension. Stable  PLAN:  . Consider MRI brain and neurology evaluation. Consider urine culture. No indication for oral anticoagulation. Continue losartan 25 mg qd, ASA 81 mg qd, simvastatin 20 mg qd.
Patient is a 85y old  Male who presents with a chief complaint of confusion (2018 18:15)      HPI:  86 y/o male with a PMHx of Parkinson's disease/dementuia, HTN, HLD, DM presents to the ED for AMS. As per son at bedside, pt woke up this morning and was not acting himself.Patient was last seen to be at his baseline was yesterday afternoon as per son . Notes he is more "withdrawn." Lives with family. As per son, pt is not being cooperative with family. not speaking/interactive/weak.. Son reported increased urinary frequency .  history derived from son at bedside, patient unreliable/poor  historian due to parkinson's disease and dmentia    Family hx- as per son no pertinent family hx of cardiac or neurological problems   social hx- quit smoking  and etoh use several decades ago    ROS: Denies fever, n/v/d, CP, sob , no dysuria, no abdominal pain ,no palpitations . (2018 18:15)      PAST MEDICAL & SURGICAL HISTORY:  HTN (hypertension)  Diabetes  Hyperlipidemia  Parkinson Disease  Adjustment Reaction with Anxiety and Depression  Enlarged Prostate  Bilateral Cataracts  History of Nephrectomy, Unilateral  S/P Inguinal Hernia Repair        MEDICATIONS  (STANDING):  aspirin enteric coated 81 milliGRAM(s) Oral daily  carbidopa 36.25 mG/levodopa 145 mG ER 4 Capsule(s) Oral <User Schedule>  carbidopa 36.25 mG/levodopa 145 mG ER 3 Capsule(s) Oral <User Schedule>  carbidopa 36.25 mG/levodopa 145 mG ER 2 Capsule(s) Oral <User Schedule>  cholecalciferol 1000 Unit(s) Oral daily  docusate sodium 100 milliGRAM(s) Oral three times a day  donepezil 5 milliGRAM(s) Oral at bedtime  escitalopram 10 milliGRAM(s) Oral daily  heparin  Injectable 5000 Unit(s) SubCutaneous every 8 hours  latanoprost 0.005% Ophthalmic Solution 1 Drop(s) Right EYE at bedtime  losartan 25 milliGRAM(s) Oral daily  mirtazapine 30 milliGRAM(s) Oral at bedtime  rasagiline Tablet 1 milliGRAM(s) Oral daily  simvastatin 20 milliGRAM(s) Oral at bedtime  sodium chloride 0.45%. 1000 milliLiter(s) (60 mL/Hr) IV Continuous <Continuous>  tamsulosin Oral Tab/Cap - Peds 0.4 milliGRAM(s) Oral at bedtime  valACYclovir 500 milliGRAM(s) Oral daily    MEDICATIONS  (PRN):  haloperidol    Injectable 0.25 milliGRAM(s) IntraMuscular every 6 hours PRN agitation          Vital Signs Last 24 Hrs  T(C): 36.2 (2018 10:39), Max: 36.2 (2018 10:39)  T(F): 97.2 (2018 10:39), Max: 97.2 (2018 10:39)  HR: 54 (2018 06:35) (45 - 54)  BP: 153/61 (2018 06:35) (152/53 - 153/61)  BP(mean): --  RR: 19 (2018 06:35) (18 - 19)  SpO2: 99% (2018 06:35) (98% - 99%)    I&O's Summary    2018 07:01  -  2018 07:00  --------------------------------------------------------  IN: 0 mL / OUT: 200 mL / NET: -200 mL      PHYSICAL EXAM  General Appearance: Chronically ill, not oriented to time or place  HEENT: PERRL, conjunctiva clear, EOM's intact, non injected pharynx, no exudate, TM   normal  Neck: Supple, , no adenopathy, thyroid: not enlarged, no carotid bruit or JVD  Back: Symmetric, no  tenderness,no soft tissue tenderness  Lungs: Clear to auscultation bilaterally,no adventitious breath sounds, normal   expiratory phase  Heart: Regular rate and rhythm, S1, S2 normal, no murmur, rub or gallop  Abdomen: Soft, non-tender, bowel sounds active , no hepatosplenomegaly  Extremities: 1-2+ edema both legs  Skin: Skin color, texture normal, no rashes   Neurologic: alert, confused, disoriented. Increased motor tone and motor retardation. INTERPRETATION OF TELEMETRY:      INTERPRETATION OF TELEMETRY: sinus jenny 45-65 with occas PAC    ECG:        LABS:                          13.2   7.28  )-----------( 164      ( 2018 06:03 )             38.8         143  |  108  |  23  ----------------------------<  132<H>  3.9   |  29  |  1.14    Ca    9.0      2018 06:03  Phos  2.4       Mg     2.2         TPro  7.1  /  Alb  4.1  /  TBili  0.5  /  DBili  x   /  AST  17  /  ALT  13  /  AlkPhos  112  07    CARDIAC MARKERS ( 2018 10:24 )  <0.015 ng/mL / x     / 80 U/L / x     / x            Pro BNP  221  @ 10:24  D Dimer  --  @ 10:24    PT/INR - ( 2018 10:24 )   PT: 11.2 sec;   INR: 1.04 ratio         PTT - ( 2018 10:24 )  PTT:30.1 sec  Urinalysis Basic - ( 2018 10:24 )    Color: Yellow / Appearance: Clear / S.010 / pH: x  Gluc: x / Ketone: Negative  / Bili: Negative / Urobili: Negative mg/dL   Blood: x / Protein: 30 mg/dL / Nitrite: Negative   Leuk Esterase: Negative / RBC: Negative /HPF / WBC Negative   Sq Epi: x / Non Sq Epi: Negative / Bacteria: Negative      IMPRESSION:  1. Sinus rhythm with APCs and not atrial fibrillation. 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  2, Acute alteration of mental status may have been due to meds, Parkinson's disease, Parkinson's meds,  acute stroke, undiagnosed urinary infection.   3,Parkinsons with chronic dementia No further neurologic evaluation at this time per Dr. Piedra.  4.Hypertension. Stable  PLAN: No indication for oral anticoagulation. Continue losartan 25 mg qd, ASA 81 mg qd, simvastatin 20 mg qd.  Agree with [facility] t plans

## 2018-07-30 LAB
CULTURE RESULTS: SIGNIFICANT CHANGE UP
CULTURE RESULTS: SIGNIFICANT CHANGE UP
SPECIMEN SOURCE: SIGNIFICANT CHANGE UP
SPECIMEN SOURCE: SIGNIFICANT CHANGE UP

## 2018-07-31 DIAGNOSIS — G20 PARKINSON'S DISEASE: ICD-10-CM

## 2018-07-31 DIAGNOSIS — N40.0 BENIGN PROSTATIC HYPERPLASIA WITHOUT LOWER URINARY TRACT SYMPTOMS: ICD-10-CM

## 2018-07-31 DIAGNOSIS — F43.23 ADJUSTMENT DISORDER WITH MIXED ANXIETY AND DEPRESSED MOOD: ICD-10-CM

## 2018-07-31 DIAGNOSIS — N18.3 CHRONIC KIDNEY DISEASE, STAGE 3 (MODERATE): ICD-10-CM

## 2018-07-31 DIAGNOSIS — G93.40 ENCEPHALOPATHY, UNSPECIFIED: ICD-10-CM

## 2018-07-31 DIAGNOSIS — F02.81 DEMENTIA IN OTHER DISEASES CLASSIFIED ELSEWHERE, UNSPECIFIED SEVERITY, WITH BEHAVIORAL DISTURBANCE: ICD-10-CM

## 2018-07-31 DIAGNOSIS — N17.9 ACUTE KIDNEY FAILURE, UNSPECIFIED: ICD-10-CM

## 2018-07-31 DIAGNOSIS — E11.22 TYPE 2 DIABETES MELLITUS WITH DIABETIC CHRONIC KIDNEY DISEASE: ICD-10-CM

## 2018-07-31 DIAGNOSIS — I48.91 UNSPECIFIED ATRIAL FIBRILLATION: ICD-10-CM

## 2018-07-31 DIAGNOSIS — I45.2 BIFASCICULAR BLOCK: ICD-10-CM

## 2018-07-31 DIAGNOSIS — R94.31 ABNORMAL ELECTROCARDIOGRAM [ECG] [EKG]: ICD-10-CM

## 2018-07-31 DIAGNOSIS — M40.209 UNSPECIFIED KYPHOSIS, SITE UNSPECIFIED: ICD-10-CM

## 2018-07-31 DIAGNOSIS — I12.9 HYPERTENSIVE CHRONIC KIDNEY DISEASE WITH STAGE 1 THROUGH STAGE 4 CHRONIC KIDNEY DISEASE, OR UNSPECIFIED CHRONIC KIDNEY DISEASE: ICD-10-CM

## 2018-07-31 DIAGNOSIS — E78.5 HYPERLIPIDEMIA, UNSPECIFIED: ICD-10-CM

## 2018-09-04 PROBLEM — I10 ESSENTIAL (PRIMARY) HYPERTENSION: Chronic | Status: ACTIVE | Noted: 2018-07-25

## 2018-10-01 ENCOUNTER — EMERGENCY (EMERGENCY)
Facility: HOSPITAL | Age: 83
LOS: 0 days | Discharge: HOME | End: 2018-10-01
Attending: EMERGENCY MEDICINE
Payer: MEDICARE

## 2018-10-01 VITALS
HEART RATE: 63 BPM | OXYGEN SATURATION: 100 % | WEIGHT: 139.99 LBS | SYSTOLIC BLOOD PRESSURE: 182 MMHG | DIASTOLIC BLOOD PRESSURE: 78 MMHG | HEIGHT: 68 IN | TEMPERATURE: 98 F | RESPIRATION RATE: 18 BRPM

## 2018-10-01 VITALS — TEMPERATURE: 100 F

## 2018-10-01 DIAGNOSIS — F03.90 UNSPECIFIED DEMENTIA WITHOUT BEHAVIORAL DISTURBANCE: ICD-10-CM

## 2018-10-01 DIAGNOSIS — E78.5 HYPERLIPIDEMIA, UNSPECIFIED: ICD-10-CM

## 2018-10-01 DIAGNOSIS — Z79.82 LONG TERM (CURRENT) USE OF ASPIRIN: ICD-10-CM

## 2018-10-01 DIAGNOSIS — Z90.5 ACQUIRED ABSENCE OF KIDNEY: ICD-10-CM

## 2018-10-01 DIAGNOSIS — G20 PARKINSON'S DISEASE: ICD-10-CM

## 2018-10-01 DIAGNOSIS — H26.9 UNSPECIFIED CATARACT: ICD-10-CM

## 2018-10-01 DIAGNOSIS — N40.0 BENIGN PROSTATIC HYPERPLASIA WITHOUT LOWER URINARY TRACT SYMPTOMS: ICD-10-CM

## 2018-10-01 DIAGNOSIS — E11.9 TYPE 2 DIABETES MELLITUS WITHOUT COMPLICATIONS: ICD-10-CM

## 2018-10-01 DIAGNOSIS — Z87.19 PERSONAL HISTORY OF OTHER DISEASES OF THE DIGESTIVE SYSTEM: ICD-10-CM

## 2018-10-01 DIAGNOSIS — I67.89 OTHER CEREBROVASCULAR DISEASE: ICD-10-CM

## 2018-10-01 DIAGNOSIS — R41.82 ALTERED MENTAL STATUS, UNSPECIFIED: ICD-10-CM

## 2018-10-01 DIAGNOSIS — I10 ESSENTIAL (PRIMARY) HYPERTENSION: ICD-10-CM

## 2018-10-01 LAB
ALBUMIN SERPL ELPH-MCNC: 4 G/DL — SIGNIFICANT CHANGE UP (ref 3.3–5)
ALP SERPL-CCNC: 109 U/L — SIGNIFICANT CHANGE UP (ref 40–120)
ALT FLD-CCNC: 14 U/L — SIGNIFICANT CHANGE UP (ref 12–78)
ANION GAP SERPL CALC-SCNC: 7 MMOL/L — SIGNIFICANT CHANGE UP (ref 5–17)
APPEARANCE UR: CLEAR — SIGNIFICANT CHANGE UP
APTT BLD: 30.4 SEC — SIGNIFICANT CHANGE UP (ref 27.5–37.4)
AST SERPL-CCNC: 13 U/L — LOW (ref 15–37)
BASOPHILS # BLD AUTO: 0.06 K/UL — SIGNIFICANT CHANGE UP (ref 0–0.2)
BASOPHILS NFR BLD AUTO: 0.9 % — SIGNIFICANT CHANGE UP (ref 0–2)
BILIRUB SERPL-MCNC: 0.5 MG/DL — SIGNIFICANT CHANGE UP (ref 0.2–1.2)
BILIRUB UR-MCNC: NEGATIVE — SIGNIFICANT CHANGE UP
BUN SERPL-MCNC: 23 MG/DL — SIGNIFICANT CHANGE UP (ref 7–23)
CALCIUM SERPL-MCNC: 9.2 MG/DL — SIGNIFICANT CHANGE UP (ref 8.5–10.1)
CHLORIDE SERPL-SCNC: 108 MMOL/L — SIGNIFICANT CHANGE UP (ref 96–108)
CO2 SERPL-SCNC: 29 MMOL/L — SIGNIFICANT CHANGE UP (ref 22–31)
COLOR SPEC: YELLOW — SIGNIFICANT CHANGE UP
CREAT SERPL-MCNC: 1.24 MG/DL — SIGNIFICANT CHANGE UP (ref 0.5–1.3)
DIFF PNL FLD: ABNORMAL
EOSINOPHIL # BLD AUTO: 0.18 K/UL — SIGNIFICANT CHANGE UP (ref 0–0.5)
EOSINOPHIL NFR BLD AUTO: 2.7 % — SIGNIFICANT CHANGE UP (ref 0–6)
GLUCOSE SERPL-MCNC: 106 MG/DL — HIGH (ref 70–99)
GLUCOSE UR QL: NEGATIVE MG/DL — SIGNIFICANT CHANGE UP
HCT VFR BLD CALC: 38.7 % — LOW (ref 39–50)
HGB BLD-MCNC: 12.9 G/DL — LOW (ref 13–17)
IMM GRANULOCYTES NFR BLD AUTO: 0.2 % — SIGNIFICANT CHANGE UP (ref 0–1.5)
INR BLD: 1.07 RATIO — SIGNIFICANT CHANGE UP (ref 0.88–1.16)
KETONES UR-MCNC: ABNORMAL
LEUKOCYTE ESTERASE UR-ACNC: NEGATIVE — SIGNIFICANT CHANGE UP
LYMPHOCYTES # BLD AUTO: 1.33 K/UL — SIGNIFICANT CHANGE UP (ref 1–3.3)
LYMPHOCYTES # BLD AUTO: 20.1 % — SIGNIFICANT CHANGE UP (ref 13–44)
MCHC RBC-ENTMCNC: 33.2 PG — SIGNIFICANT CHANGE UP (ref 27–34)
MCHC RBC-ENTMCNC: 33.3 GM/DL — SIGNIFICANT CHANGE UP (ref 32–36)
MCV RBC AUTO: 99.5 FL — SIGNIFICANT CHANGE UP (ref 80–100)
MONOCYTES # BLD AUTO: 0.42 K/UL — SIGNIFICANT CHANGE UP (ref 0–0.9)
MONOCYTES NFR BLD AUTO: 6.4 % — SIGNIFICANT CHANGE UP (ref 2–14)
NEUTROPHILS # BLD AUTO: 4.61 K/UL — SIGNIFICANT CHANGE UP (ref 1.8–7.4)
NEUTROPHILS NFR BLD AUTO: 69.7 % — SIGNIFICANT CHANGE UP (ref 43–77)
NITRITE UR-MCNC: NEGATIVE — SIGNIFICANT CHANGE UP
PH UR: 5 — SIGNIFICANT CHANGE UP (ref 5–8)
PLATELET # BLD AUTO: 176 K/UL — SIGNIFICANT CHANGE UP (ref 150–400)
POTASSIUM SERPL-MCNC: 4.3 MMOL/L — SIGNIFICANT CHANGE UP (ref 3.5–5.3)
POTASSIUM SERPL-SCNC: 4.3 MMOL/L — SIGNIFICANT CHANGE UP (ref 3.5–5.3)
PROT SERPL-MCNC: 7 GM/DL — SIGNIFICANT CHANGE UP (ref 6–8.3)
PROT UR-MCNC: 30 MG/DL
PROTHROM AB SERPL-ACNC: 11.6 SEC — SIGNIFICANT CHANGE UP (ref 9.8–12.7)
RBC # BLD: 3.89 M/UL — LOW (ref 4.2–5.8)
RBC # FLD: 11.8 % — SIGNIFICANT CHANGE UP (ref 10.3–14.5)
SODIUM SERPL-SCNC: 144 MMOL/L — SIGNIFICANT CHANGE UP (ref 135–145)
SP GR SPEC: 1.02 — SIGNIFICANT CHANGE UP (ref 1.01–1.02)
UROBILINOGEN FLD QL: NEGATIVE MG/DL — SIGNIFICANT CHANGE UP
WBC # BLD: 6.61 K/UL — SIGNIFICANT CHANGE UP (ref 3.8–10.5)
WBC # FLD AUTO: 6.61 K/UL — SIGNIFICANT CHANGE UP (ref 3.8–10.5)

## 2018-10-01 PROCEDURE — 99284 EMERGENCY DEPT VISIT MOD MDM: CPT

## 2018-10-01 PROCEDURE — 70450 CT HEAD/BRAIN W/O DYE: CPT | Mod: 26

## 2018-10-01 PROCEDURE — 93010 ELECTROCARDIOGRAM REPORT: CPT

## 2018-10-01 PROCEDURE — 71045 X-RAY EXAM CHEST 1 VIEW: CPT | Mod: 26

## 2018-10-01 NOTE — ED PROVIDER NOTE - OBJECTIVE STATEMENT
86 y/o male with a PMHx of HTN, HLD, diabetes, Parkinsons, dementia presents to the ED with AMS. Per son at bedside, pt has had worsening dementia, AMS for the past 2 days. No PO intake and unable to take medications including Parkinsons medications in the past 18 hours. Pt seen with similar symptoms 2 months ago, discharged after a negative medical workup, son states current AMS is worse than last time. +lethargic, generalized weakness. No syncope. Pt denies abd pain, chest pain, SOB or any other acute complaints at time of exam. Pt lives with children, son states he is concerned with ability to care for pt at home. Hx obtained from son at bedside. Unable to obtain a complete Hx secondary to pt's mental status. 86 y/o male with a PMHx of HTN, HLD, diabetes, Parkinsons, dementia presents to the ED with AMS. Per son at bedside, pt has had worsening dementia, AMS for the past 2 days. No PO intake and unable to take medications including Parkinsons medications in the past 18 hours. Pt seen with similar symptoms 2 months ago, discharged after a negative medical workup, son states current AMS is worse than last time. +generalized weakness. No syncope. Pt denies abd pain, chest pain, SOB or any other acute complaints at time of exam. Pt lives with children, son states he is concerned with ability to care for pt at home. Hx obtained from son at bedside. Unable to obtain a complete Hx secondary to pt's mental status.

## 2018-10-01 NOTE — ED PROVIDER NOTE - PROGRESS NOTE DETAILS
medical workup unremarkable. pt seen by social work - worked with family/gave resources for placement. ultimately family decided they want to bring patient home. patient at baseline. family states if anything gets worse with bring patient back. will d/c with follow up. Erick Gómez M.D., Attending Physician

## 2018-10-01 NOTE — ED PROVIDER NOTE - PHYSICAL EXAMINATION
Constitutional: mild distress, oriented x1.   Eyes: PERRLA EOMI  Head: Normocephalic atraumatic  Mouth: MMM  Cardiac: regular rate. No lower extremity edema. No TTP of chest wall.  Resp: Lungs CTAB  GI: Abd s/nt/nd  Neuro: CN2-12 intact  Skin: No rashes   MSK: Pelvis: stable, non-tender. Rigid extremities, full ROM without pain.

## 2018-10-01 NOTE — ED ADULT NURSE NOTE - OBJECTIVE STATEMENT
worsening AMS x2 days, hx of parkinsons/dementia, per son. Pt has decreased PO intake and increased difficulty with ADL's

## 2018-10-01 NOTE — ED PROVIDER NOTE - MEDICAL DECISION MAKING DETAILS
86 y/o male with a PMHx of HTN, HLD, dementia presents to the ED for change in mental status. As per son pt has been weaker with decreased PO intake and worsening mental status. Similar to admission in July where medical workup was negative. Here, pt is oriented x1, exam with rigidity secondary to decreased Parkinson's intake over the past few days. Spoke with family at length who states that if medical workup is negative wants to speak with  about potential admission vs increased help at home as pt is becoming more difficult to care for. Will obtain labs, CT head, social work and reassess. 86 y/o male with a PMHx of HTN, HLD, dementia presents to the ED for change in mental status. As per son pt has been weaker with decreased PO intake and worsening mental status. Similar to admission in July where medical workup was negative. Here, pt is oriented x1, exam with rigidity secondary to decreased Parkinson's med  intake over the past few days. Spoke with family at length who states that if medical workup is negative wants to speak with  about potential admission vs increased help at home as pt is becoming more difficult to care for. Will obtain labs, CT head, social work and reassess.

## 2018-10-01 NOTE — ED ADULT TRIAGE NOTE - CHIEF COMPLAINT QUOTE
worsening AMS x2 days, hx of parkinsons/dementia, nephrectomy. possible syncope? this morning in his chair?

## 2018-10-01 NOTE — ED ADULT NURSE NOTE - NSIMPLEMENTINTERV_GEN_ALL_ED
Implemented All Fall with Harm Risk Interventions:  [place] to call system. Call bell, personal items and telephone within reach. Instruct patient to call for assistance. Room bathroom lighting operational. Non-slip footwear when patient is off stretcher. Physically safe environment: no spills, clutter or unnecessary equipment. Stretcher in lowest position, wheels locked, appropriate side rails in place. Provide visual cue, wrist band, yellow gown, etc. Monitor gait and stability. Monitor for mental status changes and reorient to person, place, and time. Review medications for side effects contributing to fall risk. Reinforce activity limits and safety measures with patient and family. Provide visual clues: red socks.

## 2018-10-01 NOTE — ED PROVIDER NOTE - CARE PLAN
Principal Discharge DX:	Dementia  Assessment and plan of treatment:	1. return for worsening symptoms or anything concerning to you  2. take all home meds as prescribed  3. follow up with your pmd call to make an appointment

## 2018-10-02 LAB
CULTURE RESULTS: NO GROWTH — SIGNIFICANT CHANGE UP
SPECIMEN SOURCE: SIGNIFICANT CHANGE UP

## 2018-10-26 ENCOUNTER — EMERGENCY (EMERGENCY)
Facility: HOSPITAL | Age: 83
LOS: 0 days | Discharge: HOME | End: 2018-10-26
Attending: EMERGENCY MEDICINE | Admitting: EMERGENCY MEDICINE
Payer: MEDICARE

## 2018-10-26 VITALS
DIASTOLIC BLOOD PRESSURE: 80 MMHG | RESPIRATION RATE: 15 BRPM | WEIGHT: 149.91 LBS | SYSTOLIC BLOOD PRESSURE: 187 MMHG | HEART RATE: 66 BPM | OXYGEN SATURATION: 100 % | HEIGHT: 67 IN | TEMPERATURE: 98 F

## 2018-10-26 VITALS — TEMPERATURE: 99 F

## 2018-10-26 DIAGNOSIS — G20 PARKINSON'S DISEASE: ICD-10-CM

## 2018-10-26 DIAGNOSIS — E11.9 TYPE 2 DIABETES MELLITUS WITHOUT COMPLICATIONS: ICD-10-CM

## 2018-10-26 DIAGNOSIS — E87.5 HYPERKALEMIA: ICD-10-CM

## 2018-10-26 DIAGNOSIS — E78.5 HYPERLIPIDEMIA, UNSPECIFIED: ICD-10-CM

## 2018-10-26 DIAGNOSIS — R44.0 AUDITORY HALLUCINATIONS: ICD-10-CM

## 2018-10-26 DIAGNOSIS — I10 ESSENTIAL (PRIMARY) HYPERTENSION: ICD-10-CM

## 2018-10-26 LAB
ALBUMIN SERPL ELPH-MCNC: 3.7 G/DL — SIGNIFICANT CHANGE UP (ref 3.3–5)
ALP SERPL-CCNC: 109 U/L — SIGNIFICANT CHANGE UP (ref 40–120)
ALT FLD-CCNC: 14 U/L — SIGNIFICANT CHANGE UP (ref 12–78)
ANION GAP SERPL CALC-SCNC: 6 MMOL/L — SIGNIFICANT CHANGE UP (ref 5–17)
APPEARANCE UR: CLEAR — SIGNIFICANT CHANGE UP
AST SERPL-CCNC: 22 U/L — SIGNIFICANT CHANGE UP (ref 15–37)
BACTERIA # UR AUTO: ABNORMAL
BASOPHILS # BLD AUTO: 0.07 K/UL — SIGNIFICANT CHANGE UP (ref 0–0.2)
BASOPHILS NFR BLD AUTO: 1.1 % — SIGNIFICANT CHANGE UP (ref 0–2)
BILIRUB SERPL-MCNC: 0.5 MG/DL — SIGNIFICANT CHANGE UP (ref 0.2–1.2)
BILIRUB UR-MCNC: NEGATIVE — SIGNIFICANT CHANGE UP
BUN SERPL-MCNC: 21 MG/DL — SIGNIFICANT CHANGE UP (ref 7–23)
CALCIUM SERPL-MCNC: 9.1 MG/DL — SIGNIFICANT CHANGE UP (ref 8.5–10.1)
CHLORIDE SERPL-SCNC: 107 MMOL/L — SIGNIFICANT CHANGE UP (ref 96–108)
CO2 SERPL-SCNC: 27 MMOL/L — SIGNIFICANT CHANGE UP (ref 22–31)
COLOR SPEC: YELLOW — SIGNIFICANT CHANGE UP
CREAT SERPL-MCNC: 1.19 MG/DL — SIGNIFICANT CHANGE UP (ref 0.5–1.3)
DIFF PNL FLD: NEGATIVE — SIGNIFICANT CHANGE UP
EOSINOPHIL # BLD AUTO: 0.15 K/UL — SIGNIFICANT CHANGE UP (ref 0–0.5)
EOSINOPHIL NFR BLD AUTO: 2.4 % — SIGNIFICANT CHANGE UP (ref 0–6)
EPI CELLS # UR: SIGNIFICANT CHANGE UP
GLUCOSE SERPL-MCNC: 118 MG/DL — HIGH (ref 70–99)
GLUCOSE UR QL: NEGATIVE MG/DL — SIGNIFICANT CHANGE UP
HCT VFR BLD CALC: 40.2 % — SIGNIFICANT CHANGE UP (ref 39–50)
HGB BLD-MCNC: 13.4 G/DL — SIGNIFICANT CHANGE UP (ref 13–17)
IMM GRANULOCYTES NFR BLD AUTO: 0.5 % — SIGNIFICANT CHANGE UP (ref 0–1.5)
KETONES UR-MCNC: NEGATIVE — SIGNIFICANT CHANGE UP
LEUKOCYTE ESTERASE UR-ACNC: NEGATIVE — SIGNIFICANT CHANGE UP
LYMPHOCYTES # BLD AUTO: 1.82 K/UL — SIGNIFICANT CHANGE UP (ref 1–3.3)
LYMPHOCYTES # BLD AUTO: 29.6 % — SIGNIFICANT CHANGE UP (ref 13–44)
MCHC RBC-ENTMCNC: 33.3 GM/DL — SIGNIFICANT CHANGE UP (ref 32–36)
MCHC RBC-ENTMCNC: 33.3 PG — SIGNIFICANT CHANGE UP (ref 27–34)
MCV RBC AUTO: 100 FL — SIGNIFICANT CHANGE UP (ref 80–100)
MONOCYTES # BLD AUTO: 0.5 K/UL — SIGNIFICANT CHANGE UP (ref 0–0.9)
MONOCYTES NFR BLD AUTO: 8.1 % — SIGNIFICANT CHANGE UP (ref 2–14)
NEUTROPHILS # BLD AUTO: 3.57 K/UL — SIGNIFICANT CHANGE UP (ref 1.8–7.4)
NEUTROPHILS NFR BLD AUTO: 58.3 % — SIGNIFICANT CHANGE UP (ref 43–77)
NITRITE UR-MCNC: NEGATIVE — SIGNIFICANT CHANGE UP
NRBC # BLD: 0 /100 WBCS — SIGNIFICANT CHANGE UP (ref 0–0)
PH UR: 6 — SIGNIFICANT CHANGE UP (ref 5–8)
PLATELET # BLD AUTO: 188 K/UL — SIGNIFICANT CHANGE UP (ref 150–400)
POTASSIUM SERPL-MCNC: 4.3 MMOL/L — SIGNIFICANT CHANGE UP (ref 3.5–5.3)
POTASSIUM SERPL-MCNC: 5.6 MMOL/L — HIGH (ref 3.5–5.3)
POTASSIUM SERPL-SCNC: 4.3 MMOL/L — SIGNIFICANT CHANGE UP (ref 3.5–5.3)
POTASSIUM SERPL-SCNC: 5.6 MMOL/L — HIGH (ref 3.5–5.3)
PROT SERPL-MCNC: 7.1 GM/DL — SIGNIFICANT CHANGE UP (ref 6–8.3)
PROT UR-MCNC: 30 MG/DL
RBC # BLD: 4.02 M/UL — LOW (ref 4.2–5.8)
RBC # FLD: 11.8 % — SIGNIFICANT CHANGE UP (ref 10.3–14.5)
RBC CASTS # UR COMP ASSIST: SIGNIFICANT CHANGE UP /HPF (ref 0–4)
SODIUM SERPL-SCNC: 140 MMOL/L — SIGNIFICANT CHANGE UP (ref 135–145)
SP GR SPEC: 1.01 — SIGNIFICANT CHANGE UP (ref 1.01–1.02)
UROBILINOGEN FLD QL: NEGATIVE MG/DL — SIGNIFICANT CHANGE UP
WBC # BLD: 6.14 K/UL — SIGNIFICANT CHANGE UP (ref 3.8–10.5)
WBC # FLD AUTO: 6.14 K/UL — SIGNIFICANT CHANGE UP (ref 3.8–10.5)
WBC UR QL: SIGNIFICANT CHANGE UP

## 2018-10-26 PROCEDURE — 70450 CT HEAD/BRAIN W/O DYE: CPT | Mod: 26

## 2018-10-26 PROCEDURE — 93010 ELECTROCARDIOGRAM REPORT: CPT

## 2018-10-26 PROCEDURE — 99284 EMERGENCY DEPT VISIT MOD MDM: CPT

## 2018-10-26 PROCEDURE — 71045 X-RAY EXAM CHEST 1 VIEW: CPT | Mod: 26

## 2018-10-26 RX ORDER — SODIUM CHLORIDE 9 MG/ML
1000 INJECTION INTRAMUSCULAR; INTRAVENOUS; SUBCUTANEOUS ONCE
Qty: 0 | Refills: 0 | Status: COMPLETED | OUTPATIENT
Start: 2018-10-26 | End: 2018-10-26

## 2018-10-26 RX ORDER — DEXTROSE 50 % IN WATER 50 %
50 SYRINGE (ML) INTRAVENOUS ONCE
Qty: 0 | Refills: 0 | Status: COMPLETED | OUTPATIENT
Start: 2018-10-26 | End: 2018-10-26

## 2018-10-26 RX ORDER — INSULIN HUMAN 100 [IU]/ML
6 INJECTION, SOLUTION SUBCUTANEOUS ONCE
Qty: 0 | Refills: 0 | Status: COMPLETED | OUTPATIENT
Start: 2018-10-26 | End: 2018-10-26

## 2018-10-26 RX ORDER — ALBUTEROL 90 UG/1
2.5 AEROSOL, METERED ORAL ONCE
Qty: 0 | Refills: 0 | Status: COMPLETED | OUTPATIENT
Start: 2018-10-26 | End: 2018-10-26

## 2018-10-26 RX ORDER — SODIUM POLYSTYRENE SULFONATE 4.1 MEQ/G
15 POWDER, FOR SUSPENSION ORAL ONCE
Qty: 0 | Refills: 0 | Status: COMPLETED | OUTPATIENT
Start: 2018-10-26 | End: 2018-10-26

## 2018-10-26 RX ADMIN — SODIUM CHLORIDE 2000 MILLILITER(S): 9 INJECTION INTRAMUSCULAR; INTRAVENOUS; SUBCUTANEOUS at 12:49

## 2018-10-26 RX ADMIN — Medication 50 MILLILITER(S): at 14:12

## 2018-10-26 RX ADMIN — ALBUTEROL 2.5 MILLIGRAM(S): 90 AEROSOL, METERED ORAL at 14:12

## 2018-10-26 RX ADMIN — INSULIN HUMAN 6 UNIT(S): 100 INJECTION, SOLUTION SUBCUTANEOUS at 14:12

## 2018-10-26 RX ADMIN — SODIUM POLYSTYRENE SULFONATE 15 GRAM(S): 4.1 POWDER, FOR SUSPENSION ORAL at 14:22

## 2018-10-26 NOTE — ED PROVIDER NOTE - OBJECTIVE STATEMENT
86 y/o M with a PMHx of Parkinson's Disease, DM, HTN, and HLD, on 81mg ASA presenting to the ED with son for evaluation of possible hallucinations, not taking medications. Per son, pt was experiencing auditory hallucinations yesterday during the day and thought that he was somewhere else. Pt took his medications and ate yesterday but today, has been refusing his medications. Last ate last night. Seen in ED for similar complaint of not taking medications on 10/01. Pt requires maximum assistance for ambulation at baseline secondary to Parkinson's. NKDA.

## 2018-10-26 NOTE — ED ADULT NURSE NOTE - OBJECTIVE STATEMENT
As per son and HCP, pt with hx parkinsons disease.  States am pt had hallucinations and refused medications and refused to eat or drink.  Son states pt experienced this x 4 weeks ago and was DC home by ER.  Pt followed up with PCP and medications lowered.  Denies fever at home, pain, fall.  Pt baseline mental status as per son, A/Ox2, pt contracted, which is normal as per son.  Pt able to communicate.  VSS.

## 2018-10-26 NOTE — ED ADULT NURSE NOTE - NSIMPLEMENTINTERV_GEN_ALL_ED
Implemented All Fall with Harm Risk Interventions:  [place] to call system. Call bell, personal items and telephone within reach. Instruct patient to call for assistance. Room bathroom lighting operational. Non-slip footwear when patient is off stretcher. Physically safe environment: no spills, clutter or unnecessary equipment. Stretcher in lowest position, wheels locked, appropriate side rails in place. Provide visual cue, wrist band, yellow gown, etc. Monitor gait and stability. Monitor for mental status changes and reorient to person, place, and time. Review medications for side effects contributing to fall risk. Reinforce activity limits and safety measures with patient and family. Provide visual clues: red socks.

## 2018-10-26 NOTE — ED PROVIDER NOTE - PROGRESS NOTE DETAILS
Linda NIELSEN for ED attending, Dr. Farris: Pt's labs show hyperkalemia to 5.6. Ordered insulin/D50, Albuterol, and Kayexalate. Potassium normal on repeat exam. Harjit Erwin, DO

## 2018-10-27 LAB
CULTURE RESULTS: NO GROWTH — SIGNIFICANT CHANGE UP
SPECIMEN SOURCE: SIGNIFICANT CHANGE UP

## 2018-11-03 ENCOUNTER — EMERGENCY (EMERGENCY)
Facility: HOSPITAL | Age: 83
LOS: 0 days | Discharge: HOME | End: 2018-11-03
Attending: EMERGENCY MEDICINE | Admitting: EMERGENCY MEDICINE
Payer: MEDICARE

## 2018-11-03 VITALS
RESPIRATION RATE: 17 BRPM | OXYGEN SATURATION: 100 % | HEART RATE: 70 BPM | SYSTOLIC BLOOD PRESSURE: 170 MMHG | DIASTOLIC BLOOD PRESSURE: 55 MMHG

## 2018-11-03 VITALS — TEMPERATURE: 99 F

## 2018-11-03 DIAGNOSIS — G20 PARKINSON'S DISEASE: ICD-10-CM

## 2018-11-03 DIAGNOSIS — Z79.82 LONG TERM (CURRENT) USE OF ASPIRIN: ICD-10-CM

## 2018-11-03 DIAGNOSIS — E11.9 TYPE 2 DIABETES MELLITUS WITHOUT COMPLICATIONS: ICD-10-CM

## 2018-11-03 DIAGNOSIS — R41.82 ALTERED MENTAL STATUS, UNSPECIFIED: ICD-10-CM

## 2018-11-03 DIAGNOSIS — I10 ESSENTIAL (PRIMARY) HYPERTENSION: ICD-10-CM

## 2018-11-03 DIAGNOSIS — E86.0 DEHYDRATION: ICD-10-CM

## 2018-11-03 DIAGNOSIS — Z87.19 PERSONAL HISTORY OF OTHER DISEASES OF THE DIGESTIVE SYSTEM: ICD-10-CM

## 2018-11-03 DIAGNOSIS — Z90.5 ACQUIRED ABSENCE OF KIDNEY: ICD-10-CM

## 2018-11-03 DIAGNOSIS — R94.31 ABNORMAL ELECTROCARDIOGRAM [ECG] [EKG]: ICD-10-CM

## 2018-11-03 DIAGNOSIS — E78.5 HYPERLIPIDEMIA, UNSPECIFIED: ICD-10-CM

## 2018-11-03 DIAGNOSIS — N40.0 BENIGN PROSTATIC HYPERPLASIA WITHOUT LOWER URINARY TRACT SYMPTOMS: ICD-10-CM

## 2018-11-03 PROCEDURE — 93010 ELECTROCARDIOGRAM REPORT: CPT

## 2018-11-03 PROCEDURE — 99285 EMERGENCY DEPT VISIT HI MDM: CPT

## 2018-11-03 PROCEDURE — 71045 X-RAY EXAM CHEST 1 VIEW: CPT | Mod: 26

## 2018-11-03 RX ORDER — SODIUM CHLORIDE 9 MG/ML
2000 INJECTION INTRAMUSCULAR; INTRAVENOUS; SUBCUTANEOUS ONCE
Qty: 0 | Refills: 0 | Status: COMPLETED | OUTPATIENT
Start: 2018-11-03 | End: 2018-11-03

## 2018-11-03 RX ADMIN — SODIUM CHLORIDE 2000 MILLILITER(S): 9 INJECTION INTRAMUSCULAR; INTRAVENOUS; SUBCUTANEOUS at 13:15

## 2018-11-03 RX ADMIN — SODIUM CHLORIDE 1333.33 MILLILITER(S): 9 INJECTION INTRAMUSCULAR; INTRAVENOUS; SUBCUTANEOUS at 12:15

## 2018-11-03 NOTE — ED ADULT NURSE NOTE - NSIMPLEMENTINTERV_GEN_ALL_ED
Implemented All Universal Safety Interventions:  [place] to call system. Call bell, personal items and telephone within reach. Instruct patient to call for assistance. Room bathroom lighting operational. Non-slip footwear when patient is off stretcher. Physically safe environment: no spills, clutter or unnecessary equipment. Stretcher in lowest position, wheels locked, appropriate side rails in place.

## 2018-11-03 NOTE — ED PROVIDER NOTE - PROGRESS NOTE DETAILS
family notes subacute worsening of mental status.  offered admission for further evaluation and rehab placement.  Would prefer to take pt home.  Labs unremarkable (fluids cleared) w/ hydration.  Stable for dc. family notes subacute worsening of mental status.  offered admission for further evaluation and rehab placement.  Would prefer to take pt home.  Labs unremarkable (lactate cleared) w/ hydration.  Stable for dc.

## 2018-11-03 NOTE — ED ADULT NURSE NOTE - CHPI ED NUR SYMPTOMS NEG
no decreased eating/drinking/no dizziness/no tingling/no vomiting/no nausea/no fever/no chills/no pain

## 2018-11-03 NOTE — ED PROVIDER NOTE - NS_ ATTENDINGSCRIBEDETAILS _ED_A_ED_FT
The scribe's documentation has been prepared under my direction and personally reviewed by me in its entirety. I confirm that the note above accurately reflects all work, treatment, procedures, and medical decision-making performed by me.  Tommie Morrissey MD

## 2018-11-03 NOTE — ED PROVIDER NOTE - MEDICAL DECISION MAKING DETAILS
pt bought in by family for chronic worsening mental status.  Will hydrate, check labs and UA.  d/w family regarding placement - family to decide if labs NML.

## 2018-11-03 NOTE — ED PROVIDER NOTE - OBJECTIVE STATEMENT
85 y/o male with PMHx of HTM, HLD, DM, Parkinson's disease s/p Inguinal Hernia Repair, Bilateral Cataracts, Adjustment Reaction with Anxiety and Depression, Unilateral Nephrectomy, c/o AMS. As per son at bed side, pt barely woke up this morning. Since 8pm last night pt has had diarrhea and no fluid, medication, or food intake. Son states a similar change in behavior happened last week. +Coughing. Pt's son notes the pt is more lethargic than normal. 87 y/o male with PMHx of HTM, HLD, DM, Parkinson's disease s/p Inguinal Hernia Repair, Bilateral Cataracts, Adjustment Reaction with Anxiety and Depression, Unilateral Nephrectomy, c/o AMS. As per son at bed side, pt barely woke up this morning. Since 8pm last night pt has had diarrhea and no fluid, medication, or food intake. Son states a similar change in behavior happened last week. +Coughing. Pt's son notes the pt is more lethargic than normal.  No fever, n/v, d/c.  Seen in ED 1 wk PTA for similar + hallucinations.

## 2018-11-14 ENCOUNTER — EMERGENCY (EMERGENCY)
Facility: HOSPITAL | Age: 83
LOS: 0 days | Discharge: HOME | End: 2018-11-14
Attending: EMERGENCY MEDICINE
Payer: MEDICARE

## 2018-11-14 VITALS
TEMPERATURE: 99 F | SYSTOLIC BLOOD PRESSURE: 160 MMHG | OXYGEN SATURATION: 98 % | HEART RATE: 74 BPM | RESPIRATION RATE: 16 BRPM | DIASTOLIC BLOOD PRESSURE: 61 MMHG

## 2018-11-14 VITALS
WEIGHT: 139.99 LBS | HEART RATE: 71 BPM | RESPIRATION RATE: 22 BRPM | SYSTOLIC BLOOD PRESSURE: 181 MMHG | HEIGHT: 66 IN | DIASTOLIC BLOOD PRESSURE: 77 MMHG

## 2018-11-14 LAB
ALBUMIN SERPL ELPH-MCNC: 3.8 G/DL — SIGNIFICANT CHANGE UP (ref 3.3–5)
ALP SERPL-CCNC: 103 U/L — SIGNIFICANT CHANGE UP (ref 40–120)
ALT FLD-CCNC: 14 U/L — SIGNIFICANT CHANGE UP (ref 12–78)
ANION GAP SERPL CALC-SCNC: 6 MMOL/L — SIGNIFICANT CHANGE UP (ref 5–17)
APPEARANCE UR: CLEAR — SIGNIFICANT CHANGE UP
APTT BLD: 32 SEC — SIGNIFICANT CHANGE UP (ref 27.5–36.3)
AST SERPL-CCNC: 14 U/L — LOW (ref 15–37)
BASOPHILS # BLD AUTO: 0.03 K/UL — SIGNIFICANT CHANGE UP (ref 0–0.2)
BASOPHILS NFR BLD AUTO: 0.6 % — SIGNIFICANT CHANGE UP (ref 0–2)
BILIRUB SERPL-MCNC: 0.5 MG/DL — SIGNIFICANT CHANGE UP (ref 0.2–1.2)
BILIRUB UR-MCNC: NEGATIVE — SIGNIFICANT CHANGE UP
BUN SERPL-MCNC: 20 MG/DL — SIGNIFICANT CHANGE UP (ref 7–23)
CALCIUM SERPL-MCNC: 9 MG/DL — SIGNIFICANT CHANGE UP (ref 8.5–10.1)
CHLORIDE SERPL-SCNC: 107 MMOL/L — SIGNIFICANT CHANGE UP (ref 96–108)
CO2 SERPL-SCNC: 28 MMOL/L — SIGNIFICANT CHANGE UP (ref 22–31)
COLOR SPEC: YELLOW — SIGNIFICANT CHANGE UP
CREAT SERPL-MCNC: 1.2 MG/DL — SIGNIFICANT CHANGE UP (ref 0.5–1.3)
DIFF PNL FLD: ABNORMAL
EOSINOPHIL # BLD AUTO: 0.12 K/UL — SIGNIFICANT CHANGE UP (ref 0–0.5)
EOSINOPHIL NFR BLD AUTO: 2.6 % — SIGNIFICANT CHANGE UP (ref 0–6)
GLUCOSE SERPL-MCNC: 118 MG/DL — HIGH (ref 70–99)
GLUCOSE UR QL: NEGATIVE MG/DL — SIGNIFICANT CHANGE UP
HCT VFR BLD CALC: 38.5 % — LOW (ref 39–50)
HGB BLD-MCNC: 12.6 G/DL — LOW (ref 13–17)
IMM GRANULOCYTES NFR BLD AUTO: 0.2 % — SIGNIFICANT CHANGE UP (ref 0–1.5)
INR BLD: 1.04 RATIO — SIGNIFICANT CHANGE UP (ref 0.88–1.16)
KETONES UR-MCNC: ABNORMAL
LACTATE SERPL-SCNC: 1 MMOL/L — SIGNIFICANT CHANGE UP (ref 0.7–2)
LEUKOCYTE ESTERASE UR-ACNC: NEGATIVE — SIGNIFICANT CHANGE UP
LYMPHOCYTES # BLD AUTO: 1.11 K/UL — SIGNIFICANT CHANGE UP (ref 1–3.3)
LYMPHOCYTES # BLD AUTO: 24 % — SIGNIFICANT CHANGE UP (ref 13–44)
MCHC RBC-ENTMCNC: 32.7 GM/DL — SIGNIFICANT CHANGE UP (ref 32–36)
MCHC RBC-ENTMCNC: 32.9 PG — SIGNIFICANT CHANGE UP (ref 27–34)
MCV RBC AUTO: 100.5 FL — HIGH (ref 80–100)
MONOCYTES # BLD AUTO: 0.28 K/UL — SIGNIFICANT CHANGE UP (ref 0–0.9)
MONOCYTES NFR BLD AUTO: 6 % — SIGNIFICANT CHANGE UP (ref 2–14)
NEUTROPHILS # BLD AUTO: 3.08 K/UL — SIGNIFICANT CHANGE UP (ref 1.8–7.4)
NEUTROPHILS NFR BLD AUTO: 66.6 % — SIGNIFICANT CHANGE UP (ref 43–77)
NITRITE UR-MCNC: NEGATIVE — SIGNIFICANT CHANGE UP
PH UR: 5 — SIGNIFICANT CHANGE UP (ref 5–8)
PLATELET # BLD AUTO: 207 K/UL — SIGNIFICANT CHANGE UP (ref 150–400)
POTASSIUM SERPL-MCNC: 4.4 MMOL/L — SIGNIFICANT CHANGE UP (ref 3.5–5.3)
POTASSIUM SERPL-SCNC: 4.4 MMOL/L — SIGNIFICANT CHANGE UP (ref 3.5–5.3)
PROT SERPL-MCNC: 6.9 GM/DL — SIGNIFICANT CHANGE UP (ref 6–8.3)
PROT UR-MCNC: 30 MG/DL
PROTHROM AB SERPL-ACNC: 11.6 SEC — SIGNIFICANT CHANGE UP (ref 10–12.9)
RBC # BLD: 3.83 M/UL — LOW (ref 4.2–5.8)
RBC # FLD: 12 % — SIGNIFICANT CHANGE UP (ref 10.3–14.5)
SODIUM SERPL-SCNC: 141 MMOL/L — SIGNIFICANT CHANGE UP (ref 135–145)
SP GR SPEC: 1.01 — SIGNIFICANT CHANGE UP (ref 1.01–1.02)
UROBILINOGEN FLD QL: NEGATIVE MG/DL — SIGNIFICANT CHANGE UP
WBC # BLD: 4.63 K/UL — SIGNIFICANT CHANGE UP (ref 3.8–10.5)
WBC # FLD AUTO: 4.63 K/UL — SIGNIFICANT CHANGE UP (ref 3.8–10.5)

## 2018-11-14 PROCEDURE — 71045 X-RAY EXAM CHEST 1 VIEW: CPT | Mod: 26

## 2018-11-14 PROCEDURE — 99284 EMERGENCY DEPT VISIT MOD MDM: CPT

## 2018-11-14 RX ORDER — SODIUM CHLORIDE 9 MG/ML
1950 INJECTION INTRAMUSCULAR; INTRAVENOUS; SUBCUTANEOUS ONCE
Qty: 0 | Refills: 0 | Status: COMPLETED | OUTPATIENT
Start: 2018-11-14 | End: 2018-11-14

## 2018-11-14 RX ADMIN — SODIUM CHLORIDE 1950 MILLILITER(S): 9 INJECTION INTRAMUSCULAR; INTRAVENOUS; SUBCUTANEOUS at 13:05

## 2018-11-14 NOTE — ED ADULT TRIAGE NOTE - CHIEF COMPLAINT QUOTE
Pt presents to the Ed c/o failure to thrive. Pt from home, has not eaten or drank in the last 15 hours. Hx: dementia, htn, parkinson's disease.

## 2018-11-14 NOTE — ED ADULT NURSE NOTE - CAS DISC DELAYS ENTER DETAILS FT
Patient assistance in getting dressed and wheelchair.  Patient needed to be moved into a room and had to use the urinal.

## 2018-11-14 NOTE — ED PROVIDER NOTE - PLAN OF CARE
1. return for worsening symptoms or anything concerning to you  2. take all home meds as prescribed  3. follow up with your pmd call to make an appointment  4. you were offered admission but do not want to stay - if you change your mind return to the ED.

## 2018-11-14 NOTE — ED PROVIDER NOTE - CHPI ED SYMPTOMS POS
DECREASED EATING/DRINKING/+generalized weakness +diarrhea +soar throat +hallucinations +urin  frequency

## 2018-11-14 NOTE — ED PROVIDER NOTE - DATE/TIME 1
Discussed with pt. All questions answered. US ordered - instructed to schedule for Tuesday. Will send scheduling request for either Wednesday or Thursday next week.    14-Nov-2018 15:35

## 2018-11-14 NOTE — ED PROVIDER NOTE - CARE PLAN
Principal Discharge DX:	Weakness  Assessment and plan of treatment:	1. return for worsening symptoms or anything concerning to you  2. take all home meds as prescribed  3. follow up with your pmd call to make an appointment  4. you were offered admission but do not want to stay - if you change your mind return to the ED.

## 2018-11-14 NOTE — ED PROVIDER NOTE - MEDICAL DECISION MAKING DETAILS
84 y/o male h/o HTN, HLD, DM, Parkinson's diseases presents to the ED for weakness and failure to thrive. Pt is here with family who states he has decrease PO intake, generalized weakness and urinary frequency. States that he has had these symptoms before and often occurs when he becomes dehydrated. Pt has spoken with PMD who recommended palliative care for pt's recurring symptoms. Here, pt is oriented x0 (near baseline) with generally nonfocal exam. Concern for UTI, vs failure to thrive. Will obtain labs, EKG, symptom control and reassess.

## 2018-11-14 NOTE — ED PROVIDER NOTE - PHYSICAL EXAMINATION
Constitutional: mild distress AAOx0  Eyes: PERRLA EOMI  Head: Normocephalic atraumatic  Mouth: MMM  Cardiac: regular rate   Resp: Lungs CTAB  GI: Abd s/nt/nd  Neuro: CN2-12 intact  Skin: No rashes

## 2018-11-14 NOTE — ED ADULT NURSE NOTE - OBJECTIVE STATEMENT
Patient brought to hospital with hx of diarrhea and increased confusion over past 48 hours.  Family denies fever.  Patient refusing po fluids and food.  Patient has hx of Parkinsons, contracted and difficult to move,  Patient is incontinent of urine.  Patient has hx of dementia.  Patient ambulates at home with assistance one to two person assistance short distances.  Patient lives at home.

## 2018-11-14 NOTE — ED PROVIDER NOTE - OBJECTIVE STATEMENT
86 y/o male with PMHx of HTM, HLD, DM, Parkinson's disease s/p Inguinal Hernia Repair, Bilateral Cataracts, Adjustment Reaction with Anxiety and Depression, Unilateral Nephrectomy c/o generalized weakness x3 days. 2 days ago pt started to have diarrhea. Yesterday, pt had increased urin output and began to have hallucinations yesterday. +soar throat, loss of appetite. Pt typically has these symptoms when he becomes dehydrated. Denies fever, vomiting. Hx obtained from family at bed side. 86 y/o male with PMHx of HTM, HLD, DM, Parkinson's disease s/p Inguinal Hernia Repair, Bilateral Cataracts, Adjustment Reaction with Anxiety and Depression, Unilateral Nephrectomy c/o generalized weakness x3 days. 2 days ago pt started to have diarrhea. Yesterday, pt had increased urin output and began to have hallucinations yesterday. + loss of appetite. Pt typically has these symptoms when he becomes dehydrated. Denies fever, vomiting. Hx obtained from family at bed side.

## 2018-11-15 DIAGNOSIS — G20 PARKINSON'S DISEASE: ICD-10-CM

## 2018-11-15 DIAGNOSIS — F02.80 DEMENTIA IN OTHER DISEASES CLASSIFIED ELSEWHERE, UNSPECIFIED SEVERITY, WITHOUT BEHAVIORAL DISTURBANCE, PSYCHOTIC DISTURBANCE, MOOD DISTURBANCE, AND ANXIETY: ICD-10-CM

## 2018-11-15 DIAGNOSIS — Z90.5 ACQUIRED ABSENCE OF KIDNEY: ICD-10-CM

## 2018-11-15 DIAGNOSIS — F41.9 ANXIETY DISORDER, UNSPECIFIED: ICD-10-CM

## 2018-11-15 DIAGNOSIS — E11.9 TYPE 2 DIABETES MELLITUS WITHOUT COMPLICATIONS: ICD-10-CM

## 2018-11-15 DIAGNOSIS — R62.7 ADULT FAILURE TO THRIVE: ICD-10-CM

## 2018-11-15 DIAGNOSIS — R53.1 WEAKNESS: ICD-10-CM

## 2018-11-15 DIAGNOSIS — Z87.19 PERSONAL HISTORY OF OTHER DISEASES OF THE DIGESTIVE SYSTEM: ICD-10-CM

## 2018-11-15 DIAGNOSIS — Z79.82 LONG TERM (CURRENT) USE OF ASPIRIN: ICD-10-CM

## 2018-11-15 DIAGNOSIS — I10 ESSENTIAL (PRIMARY) HYPERTENSION: ICD-10-CM

## 2018-11-15 LAB
CULTURE RESULTS: SIGNIFICANT CHANGE UP
SPECIMEN SOURCE: SIGNIFICANT CHANGE UP

## 2018-12-11 ENCOUNTER — EMERGENCY (EMERGENCY)
Facility: HOSPITAL | Age: 83
LOS: 0 days | Discharge: HOME | End: 2018-12-11
Attending: STUDENT IN AN ORGANIZED HEALTH CARE EDUCATION/TRAINING PROGRAM | Admitting: STUDENT IN AN ORGANIZED HEALTH CARE EDUCATION/TRAINING PROGRAM
Payer: MEDICARE

## 2018-12-11 VITALS
HEART RATE: 72 BPM | SYSTOLIC BLOOD PRESSURE: 163 MMHG | DIASTOLIC BLOOD PRESSURE: 60 MMHG | OXYGEN SATURATION: 97 % | TEMPERATURE: 98 F | RESPIRATION RATE: 15 BRPM

## 2018-12-11 VITALS
RESPIRATION RATE: 18 BRPM | SYSTOLIC BLOOD PRESSURE: 167 MMHG | WEIGHT: 164.91 LBS | OXYGEN SATURATION: 96 % | HEART RATE: 107 BPM | DIASTOLIC BLOOD PRESSURE: 64 MMHG

## 2018-12-11 DIAGNOSIS — E86.0 DEHYDRATION: ICD-10-CM

## 2018-12-11 DIAGNOSIS — Z90.5 ACQUIRED ABSENCE OF KIDNEY: ICD-10-CM

## 2018-12-11 DIAGNOSIS — N40.0 BENIGN PROSTATIC HYPERPLASIA WITHOUT LOWER URINARY TRACT SYMPTOMS: ICD-10-CM

## 2018-12-11 DIAGNOSIS — E78.5 HYPERLIPIDEMIA, UNSPECIFIED: ICD-10-CM

## 2018-12-11 DIAGNOSIS — G20 PARKINSON'S DISEASE: ICD-10-CM

## 2018-12-11 DIAGNOSIS — I10 ESSENTIAL (PRIMARY) HYPERTENSION: ICD-10-CM

## 2018-12-11 DIAGNOSIS — Z79.82 LONG TERM (CURRENT) USE OF ASPIRIN: ICD-10-CM

## 2018-12-11 DIAGNOSIS — Z87.19 PERSONAL HISTORY OF OTHER DISEASES OF THE DIGESTIVE SYSTEM: ICD-10-CM

## 2018-12-11 DIAGNOSIS — R53.1 WEAKNESS: ICD-10-CM

## 2018-12-11 DIAGNOSIS — I67.89 OTHER CEREBROVASCULAR DISEASE: ICD-10-CM

## 2018-12-11 DIAGNOSIS — E11.9 TYPE 2 DIABETES MELLITUS WITHOUT COMPLICATIONS: ICD-10-CM

## 2018-12-11 DIAGNOSIS — Z88.5 ALLERGY STATUS TO NARCOTIC AGENT: ICD-10-CM

## 2018-12-11 DIAGNOSIS — R94.31 ABNORMAL ELECTROCARDIOGRAM [ECG] [EKG]: ICD-10-CM

## 2018-12-11 LAB
ANION GAP SERPL CALC-SCNC: 5 MMOL/L — SIGNIFICANT CHANGE UP (ref 5–17)
APPEARANCE UR: CLEAR — SIGNIFICANT CHANGE UP
BACTERIA # UR AUTO: ABNORMAL
BASOPHILS # BLD AUTO: 0.05 K/UL — SIGNIFICANT CHANGE UP (ref 0–0.2)
BASOPHILS NFR BLD AUTO: 0.9 % — SIGNIFICANT CHANGE UP (ref 0–2)
BILIRUB UR-MCNC: NEGATIVE — SIGNIFICANT CHANGE UP
BUN SERPL-MCNC: 30 MG/DL — HIGH (ref 7–23)
CALCIUM SERPL-MCNC: 9.3 MG/DL — SIGNIFICANT CHANGE UP (ref 8.5–10.1)
CHLORIDE SERPL-SCNC: 108 MMOL/L — SIGNIFICANT CHANGE UP (ref 96–108)
CK SERPL-CCNC: 56 U/L — SIGNIFICANT CHANGE UP (ref 26–308)
CO2 SERPL-SCNC: 30 MMOL/L — SIGNIFICANT CHANGE UP (ref 22–31)
COLOR SPEC: YELLOW — SIGNIFICANT CHANGE UP
CREAT SERPL-MCNC: 1.23 MG/DL — SIGNIFICANT CHANGE UP (ref 0.5–1.3)
DIFF PNL FLD: NEGATIVE — SIGNIFICANT CHANGE UP
EOSINOPHIL # BLD AUTO: 0.17 K/UL — SIGNIFICANT CHANGE UP (ref 0–0.5)
EOSINOPHIL NFR BLD AUTO: 3 % — SIGNIFICANT CHANGE UP (ref 0–6)
EPI CELLS # UR: SIGNIFICANT CHANGE UP
GLUCOSE SERPL-MCNC: 122 MG/DL — HIGH (ref 70–99)
GLUCOSE UR QL: NEGATIVE MG/DL — SIGNIFICANT CHANGE UP
HCT VFR BLD CALC: 38.8 % — LOW (ref 39–50)
HGB BLD-MCNC: 12.6 G/DL — LOW (ref 13–17)
IMM GRANULOCYTES NFR BLD AUTO: 0.5 % — SIGNIFICANT CHANGE UP (ref 0–1.5)
KETONES UR-MCNC: ABNORMAL
LEUKOCYTE ESTERASE UR-ACNC: NEGATIVE — SIGNIFICANT CHANGE UP
LYMPHOCYTES # BLD AUTO: 1.24 K/UL — SIGNIFICANT CHANGE UP (ref 1–3.3)
LYMPHOCYTES # BLD AUTO: 22.1 % — SIGNIFICANT CHANGE UP (ref 13–44)
MCHC RBC-ENTMCNC: 32.5 GM/DL — SIGNIFICANT CHANGE UP (ref 32–36)
MCHC RBC-ENTMCNC: 33.7 PG — SIGNIFICANT CHANGE UP (ref 27–34)
MCV RBC AUTO: 103.7 FL — HIGH (ref 80–100)
MONOCYTES # BLD AUTO: 0.38 K/UL — SIGNIFICANT CHANGE UP (ref 0–0.9)
MONOCYTES NFR BLD AUTO: 6.8 % — SIGNIFICANT CHANGE UP (ref 2–14)
NEUTROPHILS # BLD AUTO: 3.75 K/UL — SIGNIFICANT CHANGE UP (ref 1.8–7.4)
NEUTROPHILS NFR BLD AUTO: 66.7 % — SIGNIFICANT CHANGE UP (ref 43–77)
NITRITE UR-MCNC: NEGATIVE — SIGNIFICANT CHANGE UP
NRBC # BLD: 0 /100 WBCS — SIGNIFICANT CHANGE UP (ref 0–0)
PH UR: 6 — SIGNIFICANT CHANGE UP (ref 5–8)
PLATELET # BLD AUTO: 205 K/UL — SIGNIFICANT CHANGE UP (ref 150–400)
POTASSIUM SERPL-MCNC: 5.1 MMOL/L — SIGNIFICANT CHANGE UP (ref 3.5–5.3)
POTASSIUM SERPL-SCNC: 5.1 MMOL/L — SIGNIFICANT CHANGE UP (ref 3.5–5.3)
PROT UR-MCNC: 30 MG/DL
RBC # BLD: 3.74 M/UL — LOW (ref 4.2–5.8)
RBC # FLD: 11.9 % — SIGNIFICANT CHANGE UP (ref 10.3–14.5)
RBC CASTS # UR COMP ASSIST: SIGNIFICANT CHANGE UP /HPF (ref 0–4)
SODIUM SERPL-SCNC: 143 MMOL/L — SIGNIFICANT CHANGE UP (ref 135–145)
SP GR SPEC: 1.02 — SIGNIFICANT CHANGE UP (ref 1.01–1.02)
UROBILINOGEN FLD QL: NEGATIVE MG/DL — SIGNIFICANT CHANGE UP
WBC # BLD: 5.62 K/UL — SIGNIFICANT CHANGE UP (ref 3.8–10.5)
WBC # FLD AUTO: 5.62 K/UL — SIGNIFICANT CHANGE UP (ref 3.8–10.5)
WBC UR QL: SIGNIFICANT CHANGE UP

## 2018-12-11 PROCEDURE — 93010 ELECTROCARDIOGRAM REPORT: CPT

## 2018-12-11 PROCEDURE — 99284 EMERGENCY DEPT VISIT MOD MDM: CPT

## 2018-12-11 PROCEDURE — 70450 CT HEAD/BRAIN W/O DYE: CPT | Mod: 26

## 2018-12-11 PROCEDURE — 71045 X-RAY EXAM CHEST 1 VIEW: CPT | Mod: 26

## 2018-12-11 RX ORDER — LOSARTAN POTASSIUM 100 MG/1
1 TABLET, FILM COATED ORAL
Qty: 0 | Refills: 0 | COMMUNITY

## 2018-12-11 RX ORDER — SODIUM CHLORIDE 9 MG/ML
1000 INJECTION INTRAMUSCULAR; INTRAVENOUS; SUBCUTANEOUS ONCE
Qty: 0 | Refills: 0 | Status: COMPLETED | OUTPATIENT
Start: 2018-12-11 | End: 2018-12-11

## 2018-12-11 RX ORDER — CARBIDOPA AND LEVODOPA 25; 100 MG/1; MG/1
1 TABLET ORAL ONCE
Qty: 0 | Refills: 0 | Status: COMPLETED | OUTPATIENT
Start: 2018-12-11 | End: 2018-12-11

## 2018-12-11 RX ORDER — ESCITALOPRAM OXALATE 10 MG/1
1 TABLET, FILM COATED ORAL
Qty: 0 | Refills: 0 | COMMUNITY

## 2018-12-11 RX ADMIN — SODIUM CHLORIDE 1000 MILLILITER(S): 9 INJECTION INTRAMUSCULAR; INTRAVENOUS; SUBCUTANEOUS at 12:32

## 2018-12-11 RX ADMIN — SODIUM CHLORIDE 1000 MILLILITER(S): 9 INJECTION INTRAMUSCULAR; INTRAVENOUS; SUBCUTANEOUS at 13:33

## 2018-12-11 RX ADMIN — CARBIDOPA AND LEVODOPA 1 TABLET(S): 25; 100 TABLET ORAL at 14:19

## 2018-12-11 NOTE — ED ADULT TRIAGE NOTE - RESPIRATORY RATE (BREATHS/MIN)
Vaccine Information Statement(s) was given today. This has been reviewed, questions answered, and verbal consent given by Patient for injection(s) and administration of Influenza (Inactivated).    1. Does the patient have a moderate to severe fever?  No  2. Has the patient had a serious reaction to a flu shot before?   No  3. Has the patient ever had Guillian Cashiers Syndrome within 6 weeks of a previous flu shot?  No  4. Does the patient have a serious allergy to eggs?  No  5. Is the patient less that 6 months of age?  No    Patient is eligible to receive the vaccine based on all questions being answered as 'No'.        Patient tolerated without incident. See immunization grid for documentation.     18

## 2018-12-11 NOTE — ED PROVIDER NOTE - PROGRESS NOTE DETAILS
Linda NIELSEN for ED attending, Dr. Abrams: Spoke with family at bedside. Would like IV fluids to finish and would like to take pt home. Linda NIELSEN for ED attending, Dr. Ibanez: Sign out to Dr. Ibanez at this time pending urine and reeval. Linda NIELSEN for ED attending, Dr. Aguero: Spoke with family at bedside. Would like IV fluids to finish and would like to take pt home. Lnida NIELSEN for ED attending, Dr. Aguero: Sign out to Dr. Ibanez at this time pending urine and reeval. CC:  Received signout from Dr. Aguero to f/u U/A result (negative), reassess.  Pt tolerating po fluids well, family agreeable to take pt back home, resume regular meds, encourage po fluids, food.  Baseline pt unable to self-ambulate.  Will D/C.

## 2018-12-11 NOTE — ED ADULT TRIAGE NOTE - CHIEF COMPLAINT QUOTE
pt presents to ED due to possible dehydration as per son pt has been uncooperative with frequent falls

## 2018-12-11 NOTE — ED PROVIDER NOTE - CARE PLAN
Principal Discharge DX:	Dehydration Principal Discharge DX:	Dehydration  Secondary Diagnosis:	Parkinson disease

## 2018-12-11 NOTE — ED ADULT NURSE NOTE - NSIMPLEMENTINTERV_GEN_ALL_ED
Implemented All Fall with Harm Risk Interventions:  [place] to call system. Call bell, personal items and telephone within reach. Instruct patient to call for assistance. Room bathroom lighting operational. Non-slip footwear when patient is off stretcher. Physically safe environment: no spills, clutter or unnecessary equipment. Stretcher in lowest position, wheels locked, appropriate side rails in place. Provide visual cue, wrist band, yellow gown, etc. Monitor gait and stability. Monitor for mental status changes and reorient to person, place, and time. Review medications for side effects contributing to fall risk. Reinforce activity limits and safety measures with patient and family. Provide visual clues: red socks.

## 2018-12-11 NOTE — ED PROVIDER NOTE - OBJECTIVE STATEMENT
84 y/o M with a PMHx of Parkinson's Disease w/ dementia, DM, HTN, and HLD presenting to the ED via EMS with son for evaluation of generalized weakness. Per son, pt with worsening generalized weakness and refusing any PO intake today, not eating, drinking, or taking medications. Last took meds last night. Son states that has also been c/o body aches for the past few days. Hx of similar sx secondary to dehydration. No fevers. While in ED, pt with no complaints. No recent PNA or UTIs. Presents with old ecchymosis to the face s/p multiple falls over the past month, none in the past few days. Neurologist: Dr. Torres. PMD: Dr. Wade. Unable to obtain full HPI secondary to pt's Parkinson's dementia. 84 y/o M with a PMHx of Parkinson's Disease w/ dementia, DM, HTN, and HLD presenting to the ED via EMS with son for evaluation of generalized weakness. Per son, pt with worsening generalized weakness and refusing any PO intake today, not eating, drinking, or taking medications. Last took meds last night. Son states that pt has also been c/o body aches for the past few days. Hx of similar sx secondary to dehydration. No fevers. While in ED, pt with no complaints. No recent PNA or UTIs. Presents with old ecchymosis to the face s/p multiple falls over the past month, none in the past few days. Neurologist: Dr. Torres?. PMD: Dr. Concepcion. Unable to obtain full HPI secondary to pt's Parkinson's dementia. 86 y/o M with a PMHx of Parkinson's Disease w/ dementia, DM, HTN, and HLD presenting to the ED via EMS with son for evaluation of generalized weakness. Per son, pt with worsening generalized weakness and refusing any PO intake today, not eating, drinking, or taking medications. Last took meds last night. Son states that pt has also been c/o body aches for the past few days. Hx of similar sx secondary to dehydration. No fevers. While in ED, pt with no complaints. No recent PNA or UTIs. Presents with old ecchymosis to the face s/p multiple falls over the past month, none in the past few days. Neurologist: Dr. Erick Horn. PMD: Dr. Herve Concepcion. Unable to obtain full HPI secondary to pt's Parkinson's dementia.

## 2018-12-12 LAB
CULTURE RESULTS: SIGNIFICANT CHANGE UP
SPECIMEN SOURCE: SIGNIFICANT CHANGE UP

## 2018-12-17 ENCOUNTER — APPOINTMENT (OUTPATIENT)
Dept: DERMATOLOGY | Facility: CLINIC | Age: 83
End: 2018-12-17
Payer: MEDICARE

## 2018-12-17 DIAGNOSIS — D22.9 MELANOCYTIC NEVI, UNSPECIFIED: ICD-10-CM

## 2018-12-17 DIAGNOSIS — L85.3 XEROSIS CUTIS: ICD-10-CM

## 2018-12-17 DIAGNOSIS — Z00.00 ENCOUNTER FOR GENERAL ADULT MEDICAL EXAMINATION W/OUT ABNORMAL FINDINGS: ICD-10-CM

## 2018-12-17 DIAGNOSIS — L82.1 OTHER SEBORRHEIC KERATOSIS: ICD-10-CM

## 2018-12-17 PROCEDURE — 99213 OFFICE O/P EST LOW 20 MIN: CPT

## 2018-12-17 RX ORDER — PIMAVANSERIN TARTRATE 17 MG/1
17 TABLET, COATED ORAL
Refills: 0 | Status: ACTIVE | COMMUNITY

## 2018-12-17 RX ORDER — ESCITALOPRAM OXALATE 5 MG/1
TABLET, FILM COATED ORAL
Refills: 0 | Status: DISCONTINUED | COMMUNITY
End: 2018-12-17

## 2018-12-17 RX ORDER — OMEPRAZOLE 40 MG/1
40 CAPSULE, DELAYED RELEASE ORAL
Refills: 0 | Status: ACTIVE | COMMUNITY

## 2018-12-23 ENCOUNTER — EMERGENCY (EMERGENCY)
Facility: HOSPITAL | Age: 83
LOS: 0 days | Discharge: HOME | End: 2018-12-23
Attending: EMERGENCY MEDICINE | Admitting: EMERGENCY MEDICINE
Payer: MEDICARE

## 2018-12-23 VITALS
OXYGEN SATURATION: 98 % | HEART RATE: 63 BPM | RESPIRATION RATE: 18 BRPM | SYSTOLIC BLOOD PRESSURE: 166 MMHG | WEIGHT: 184.97 LBS | TEMPERATURE: 97 F | DIASTOLIC BLOOD PRESSURE: 86 MMHG

## 2018-12-23 VITALS
RESPIRATION RATE: 18 BRPM | DIASTOLIC BLOOD PRESSURE: 74 MMHG | HEART RATE: 64 BPM | TEMPERATURE: 98 F | OXYGEN SATURATION: 98 % | SYSTOLIC BLOOD PRESSURE: 154 MMHG

## 2018-12-23 DIAGNOSIS — G20 PARKINSON'S DISEASE: ICD-10-CM

## 2018-12-23 DIAGNOSIS — I10 ESSENTIAL (PRIMARY) HYPERTENSION: ICD-10-CM

## 2018-12-23 DIAGNOSIS — Z90.5 ACQUIRED ABSENCE OF KIDNEY: ICD-10-CM

## 2018-12-23 DIAGNOSIS — N40.0 BENIGN PROSTATIC HYPERPLASIA WITHOUT LOWER URINARY TRACT SYMPTOMS: ICD-10-CM

## 2018-12-23 DIAGNOSIS — E78.5 HYPERLIPIDEMIA, UNSPECIFIED: ICD-10-CM

## 2018-12-23 DIAGNOSIS — E11.9 TYPE 2 DIABETES MELLITUS WITHOUT COMPLICATIONS: ICD-10-CM

## 2018-12-23 LAB
ALBUMIN SERPL ELPH-MCNC: 3.9 G/DL — SIGNIFICANT CHANGE UP (ref 3.3–5)
ALP SERPL-CCNC: 128 U/L — HIGH (ref 40–120)
ALT FLD-CCNC: 9 U/L — LOW (ref 12–78)
ANION GAP SERPL CALC-SCNC: 5 MMOL/L — SIGNIFICANT CHANGE UP (ref 5–17)
APPEARANCE UR: CLEAR — SIGNIFICANT CHANGE UP
AST SERPL-CCNC: 13 U/L — LOW (ref 15–37)
BACTERIA # UR AUTO: ABNORMAL
BASOPHILS # BLD AUTO: 0.05 K/UL — SIGNIFICANT CHANGE UP (ref 0–0.2)
BASOPHILS NFR BLD AUTO: 0.8 % — SIGNIFICANT CHANGE UP (ref 0–2)
BILIRUB SERPL-MCNC: 0.5 MG/DL — SIGNIFICANT CHANGE UP (ref 0.2–1.2)
BILIRUB UR-MCNC: NEGATIVE — SIGNIFICANT CHANGE UP
BUN SERPL-MCNC: 27 MG/DL — HIGH (ref 7–23)
CALCIUM SERPL-MCNC: 9.3 MG/DL — SIGNIFICANT CHANGE UP (ref 8.5–10.1)
CHLORIDE SERPL-SCNC: 108 MMOL/L — SIGNIFICANT CHANGE UP (ref 96–108)
CO2 SERPL-SCNC: 30 MMOL/L — SIGNIFICANT CHANGE UP (ref 22–31)
COLOR SPEC: YELLOW — SIGNIFICANT CHANGE UP
CREAT SERPL-MCNC: 1.32 MG/DL — HIGH (ref 0.5–1.3)
DIFF PNL FLD: NEGATIVE — SIGNIFICANT CHANGE UP
EOSINOPHIL # BLD AUTO: 0.19 K/UL — SIGNIFICANT CHANGE UP (ref 0–0.5)
EOSINOPHIL NFR BLD AUTO: 3.1 % — SIGNIFICANT CHANGE UP (ref 0–6)
GLUCOSE SERPL-MCNC: 124 MG/DL — HIGH (ref 70–99)
GLUCOSE UR QL: NEGATIVE MG/DL — SIGNIFICANT CHANGE UP
HCT VFR BLD CALC: 37.8 % — LOW (ref 39–50)
HGB BLD-MCNC: 12.4 G/DL — LOW (ref 13–17)
IMM GRANULOCYTES NFR BLD AUTO: 0.2 % — SIGNIFICANT CHANGE UP (ref 0–1.5)
KETONES UR-MCNC: ABNORMAL
LEUKOCYTE ESTERASE UR-ACNC: NEGATIVE — SIGNIFICANT CHANGE UP
LYMPHOCYTES # BLD AUTO: 1.75 K/UL — SIGNIFICANT CHANGE UP (ref 1–3.3)
LYMPHOCYTES # BLD AUTO: 28.5 % — SIGNIFICANT CHANGE UP (ref 13–44)
MCHC RBC-ENTMCNC: 32.8 GM/DL — SIGNIFICANT CHANGE UP (ref 32–36)
MCHC RBC-ENTMCNC: 34.1 PG — HIGH (ref 27–34)
MCV RBC AUTO: 103.8 FL — HIGH (ref 80–100)
MONOCYTES # BLD AUTO: 0.44 K/UL — SIGNIFICANT CHANGE UP (ref 0–0.9)
MONOCYTES NFR BLD AUTO: 7.2 % — SIGNIFICANT CHANGE UP (ref 2–14)
NEUTROPHILS # BLD AUTO: 3.71 K/UL — SIGNIFICANT CHANGE UP (ref 1.8–7.4)
NEUTROPHILS NFR BLD AUTO: 60.2 % — SIGNIFICANT CHANGE UP (ref 43–77)
NITRITE UR-MCNC: NEGATIVE — SIGNIFICANT CHANGE UP
NRBC # BLD: 0 /100 WBCS — SIGNIFICANT CHANGE UP (ref 0–0)
PH UR: 6 — SIGNIFICANT CHANGE UP (ref 5–8)
PLATELET # BLD AUTO: 195 K/UL — SIGNIFICANT CHANGE UP (ref 150–400)
POTASSIUM SERPL-MCNC: 4.8 MMOL/L — SIGNIFICANT CHANGE UP (ref 3.5–5.3)
POTASSIUM SERPL-SCNC: 4.8 MMOL/L — SIGNIFICANT CHANGE UP (ref 3.5–5.3)
PROT SERPL-MCNC: 7 GM/DL — SIGNIFICANT CHANGE UP (ref 6–8.3)
PROT UR-MCNC: 30 MG/DL
RBC # BLD: 3.64 M/UL — LOW (ref 4.2–5.8)
RBC # FLD: 11.9 % — SIGNIFICANT CHANGE UP (ref 10.3–14.5)
RBC CASTS # UR COMP ASSIST: SIGNIFICANT CHANGE UP /HPF (ref 0–4)
SODIUM SERPL-SCNC: 143 MMOL/L — SIGNIFICANT CHANGE UP (ref 135–145)
SP GR SPEC: 1.01 — SIGNIFICANT CHANGE UP (ref 1.01–1.02)
UROBILINOGEN FLD QL: NEGATIVE MG/DL — SIGNIFICANT CHANGE UP
WBC # BLD: 6.15 K/UL — SIGNIFICANT CHANGE UP (ref 3.8–10.5)
WBC # FLD AUTO: 6.15 K/UL — SIGNIFICANT CHANGE UP (ref 3.8–10.5)
WBC UR QL: SIGNIFICANT CHANGE UP

## 2018-12-23 PROCEDURE — 93010 ELECTROCARDIOGRAM REPORT: CPT

## 2018-12-23 PROCEDURE — 71045 X-RAY EXAM CHEST 1 VIEW: CPT | Mod: 26

## 2018-12-23 PROCEDURE — 99284 EMERGENCY DEPT VISIT MOD MDM: CPT

## 2018-12-23 RX ORDER — SODIUM CHLORIDE 9 MG/ML
1000 INJECTION INTRAMUSCULAR; INTRAVENOUS; SUBCUTANEOUS ONCE
Qty: 0 | Refills: 0 | Status: COMPLETED | OUTPATIENT
Start: 2018-12-23 | End: 2018-12-23

## 2018-12-23 RX ADMIN — SODIUM CHLORIDE 1000 MILLILITER(S): 9 INJECTION INTRAMUSCULAR; INTRAVENOUS; SUBCUTANEOUS at 13:18

## 2018-12-23 RX ADMIN — SODIUM CHLORIDE 1000 MILLILITER(S): 9 INJECTION INTRAMUSCULAR; INTRAVENOUS; SUBCUTANEOUS at 14:33

## 2018-12-23 RX ADMIN — SODIUM CHLORIDE 1000 MILLILITER(S): 9 INJECTION INTRAMUSCULAR; INTRAVENOUS; SUBCUTANEOUS at 13:30

## 2018-12-23 RX ADMIN — SODIUM CHLORIDE 2000 MILLILITER(S): 9 INJECTION INTRAMUSCULAR; INTRAVENOUS; SUBCUTANEOUS at 12:17

## 2018-12-23 NOTE — ED PROVIDER NOTE - OBJECTIVE STATEMENT
86 y/o male with a PMHx of HTN, HLD, DM, Parkinson's, enlarged prostate, adjustment reaction with anxiety and depression, s/p bilateral cataracts, s/p nephrectomy, s/p inguinal hernia repair presents to the ED c/o being non-interactive since 11:00 am. According to pt's son, pt was able to interact and take medication at 10:00 am this morning. Pt then became non-interactive at 11:00am, could not understand his son or take medication. +Coughing with phlegm which PCP attributes to advanced Parkinson's. Denies fever. Pt's son reports similar episodes of being non-interactive while dehydrated. Pt's son reports pt fell off a chair last night with no injury and has had bruising around his eyes for 2.5 weeks. 84 y/o male with a PMHx of HTN, HLD, DM, Parkinson's, enlarged prostate, adjustment reaction with anxiety and depression, s/p bilateral cataracts, s/p nephrectomy, s/p inguinal hernia repair presents to the ED c/o being non-interactive since 11:00 am. According to pt's son, became lethargic overnight, not speaking.  +Coughing with phlegm which PCP attributes to advanced Parkinson's. Denies fever. Pt's son reports similar episodes of being non-interactive while dehydrated. Multiple similar events to ED.  Improves w/ IV hydration. Notes fall 3 wks PTA, bilateral supraorbital bruising, evaluated prior.

## 2018-12-23 NOTE — ED ADULT NURSE NOTE - NSIMPLEMENTINTERV_GEN_ALL_ED
Implemented All Fall with Harm Risk Interventions:  [place] to call system. Call bell, personal items and telephone within reach. Instruct patient to call for assistance. Room bathroom lighting operational. Non-slip footwear when patient is off stretcher. Physically safe environment: no spills, clutter or unnecessary equipment. Stretcher in lowest position, wheels locked, appropriate side rails in place. Provide visual cue, wrist band, yellow gown, etc. Monitor gait and stability. Monitor for mental status changes and reorient to person, place, and time. Review medications for side effects contributing to fall risk. Reinforce activity limits and safety measures with patient and family. Provide visual clues: red socks.

## 2018-12-23 NOTE — ED PROVIDER NOTE - NSFOLLOWUPINSTRUCTIONS_ED_ALL_ED_FT
Please follow up with your Primary MD in 24-48 hr as needed.  Please read the discharge instruction in this packet.  Seek immediate medical care for any new/worsening signs or symptoms including fever, vomiting, difficulty breathing, or any concerns.

## 2018-12-23 NOTE — ED PROVIDER NOTE - MEDICAL DECISION MAKING DETAILS
Pt with hx of advanced Parkinson's and multiple visits to ER for dehydration. Will UA and give fluids.

## 2018-12-23 NOTE — ED PROVIDER NOTE - PHYSICAL EXAMINATION
***GEN - NAD; well appearing; Alert. +Old. +Non-verbal. +Supraorbital bruising bilaterally. ***HEAD - NC/AT   ***PULMONARY - CTA b/l, symmetric breath sounds. ***CARDIAC -s1s2, RRR, no M,G,R  ***ABDOMEN - ND, NT, soft, no guarding, no rebound, no andreea's   ***SKIN - no rash or bruising   ***NEUROLOGIC - alert and oriented, follows commands, sensation nl, motor nl, ***PSYCH - insight and judgment nl, memory nl, affect nl, thought nl ***GEN - NAD; well appearing; Alert. +Supraorbital bruising bilaterally. ***HEAD - NC/AT   ***PULMONARY - CTA b/l, symmetric breath sounds. ***CARDIAC -s1s2, RRR, no M,G,R  ***ABDOMEN - ND, NT, soft, no guarding, no rebound, no andreea's   ***SKIN - no rash or bruising   ***NEUROLOGIC - alert ***PSYCH - alert ***GEN - NAD; well appearing; Alert. ***HEAD - NC/AT   ***PULMONARY - CTA b/l, symmetric breath sounds. ***CARDIAC -s1s2, RRR, no M,G,R  ***ABDOMEN - ND, NT, soft, no guarding, no rebound, no andreea's   ***SKIN -+Supraorbital bruising bilaterally.   ***NEUROLOGIC - alert ***PSYCH - alert

## 2019-02-07 ENCOUNTER — EMERGENCY (EMERGENCY)
Facility: HOSPITAL | Age: 84
LOS: 0 days | Discharge: HOME | End: 2019-02-07
Attending: STUDENT IN AN ORGANIZED HEALTH CARE EDUCATION/TRAINING PROGRAM | Admitting: STUDENT IN AN ORGANIZED HEALTH CARE EDUCATION/TRAINING PROGRAM
Payer: MEDICARE

## 2019-02-07 VITALS
SYSTOLIC BLOOD PRESSURE: 136 MMHG | TEMPERATURE: 98 F | OXYGEN SATURATION: 99 % | DIASTOLIC BLOOD PRESSURE: 65 MMHG | HEART RATE: 62 BPM | RESPIRATION RATE: 18 BRPM

## 2019-02-07 VITALS
HEART RATE: 65 BPM | TEMPERATURE: 98 F | SYSTOLIC BLOOD PRESSURE: 164 MMHG | HEIGHT: 66 IN | WEIGHT: 130.07 LBS | OXYGEN SATURATION: 100 % | DIASTOLIC BLOOD PRESSURE: 76 MMHG | RESPIRATION RATE: 18 BRPM

## 2019-02-07 DIAGNOSIS — M25.512 PAIN IN LEFT SHOULDER: ICD-10-CM

## 2019-02-07 DIAGNOSIS — G20 PARKINSON'S DISEASE: ICD-10-CM

## 2019-02-07 DIAGNOSIS — S30.810A ABRASION OF LOWER BACK AND PELVIS, INITIAL ENCOUNTER: ICD-10-CM

## 2019-02-07 DIAGNOSIS — Z88.5 ALLERGY STATUS TO NARCOTIC AGENT: ICD-10-CM
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
## 2019-02-07 DIAGNOSIS — E11.9 TYPE 2 DIABETES MELLITUS WITHOUT COMPLICATIONS: ICD-10-CM

## 2019-02-07 DIAGNOSIS — E78.5 HYPERLIPIDEMIA, UNSPECIFIED: ICD-10-CM

## 2019-02-07 DIAGNOSIS — S20.212A CONTUSION OF LEFT FRONT WALL OF THORAX, INITIAL ENCOUNTER: ICD-10-CM
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
## 2019-02-07 DIAGNOSIS — I10 ESSENTIAL (PRIMARY) HYPERTENSION: ICD-10-CM

## 2019-02-07 PROCEDURE — 70450 CT HEAD/BRAIN W/O DYE: CPT | Mod: 26

## 2019-02-07 PROCEDURE — 73030 X-RAY EXAM OF SHOULDER: CPT | Mod: 26,LT

## 2019-02-07 PROCEDURE — 93010 ELECTROCARDIOGRAM REPORT: CPT

## 2019-02-07 PROCEDURE — 72125 CT NECK SPINE W/O DYE: CPT | Mod: 26

## 2019-02-07 PROCEDURE — 71250 CT THORAX DX C-: CPT | Mod: 26

## 2019-02-07 PROCEDURE — 74176 CT ABD & PELVIS W/O CONTRAST: CPT | Mod: 26

## 2019-02-07 PROCEDURE — 73060 X-RAY EXAM OF HUMERUS: CPT | Mod: 26,LT

## 2019-02-07 PROCEDURE — 99284 EMERGENCY DEPT VISIT MOD MDM: CPT | Mod: 25

## 2019-02-07 NOTE — ED PROVIDER NOTE - OBJECTIVE STATEMENT
86 y/o male with PMHx of HTN, HLD, DM Parkinson's, GERD on low dose ASA presents to the ED s/p unwitnessed mechanical fall yesterday. +bruising and abrasion to back. +L shoulder pain. Pt was getting out of chair without assistance, when he fell from standing height, questionably hitting against night stand. Given 2 Tylenol this AM. Sons at bedside providing history.

## 2019-02-07 NOTE — ED ADULT TRIAGE NOTE - CHIEF COMPLAINT QUOTE
pt fell unwitnessed yesterday at 5pm, takes asa, holding left anterior ribs, but denies pain. hx of parkinsons and dementia. trauma alert initiated.

## 2019-02-07 NOTE — ED PROVIDER NOTE - PROGRESS NOTE DETAILS
Linda WALDEN for ED Attending Dr. Aguero: Family at bedside would like to be seen by social work. More VICTOR: cleared by social work; cts negative; xray negative; patient to take patient home; tylenol prn pain; f/u with pmd in 1-2 days without fail; strict return precautions given.

## 2019-02-07 NOTE — ED ADULT NURSE NOTE - OBJECTIVE STATEMENT
Pt is a 85y male, VSS, presents to ED s/p fall yesterday, c/o bruise on back & left shoulder pain, unwitnessed, unknown LOC, pt confused at baseline per family. No obviuos bleeding, swelling or deformity. Pt trauma alert cancelled by MD Aguero.

## 2019-02-07 NOTE — ED PROVIDER NOTE - SKIN, MLM
+old ecchymosis to L anterior lateral shoulder. +abrasion to ecchymosis to L anterior lateral rib 7-9.

## 2019-02-07 NOTE — ED PROVIDER NOTE - PMH
Diabetes    GERD (gastroesophageal reflux disease)    HTN (hypertension)    Hyperlipidemia    Parkinson Disease

## 2019-02-07 NOTE — ED ADULT NURSE NOTE - NSIMPLEMENTINTERV_GEN_ALL_ED
Implemented All Fall with Harm Risk Interventions:  [place] to call system. Call bell, personal items and telephone within reach. Instruct patient to call for assistance. Room bathroom lighting operational. Non-slip footwear when patient is off stretcher. Physically safe environment: no spills, clutter or unnecessary equipment. Stretcher in lowest position, wheels locked, appropriate side rails in place. Provide visual cue, wrist band, yellow gown, etc. Monitor gait and stability. Monitor for mental status changes and reorient to person, place, and time. Review medications for side effects contributing to fall risk. Reinforce activity limits and safety measures with patient and family. Provide visual clues: red socks.

## 2019-02-13 ENCOUNTER — INPATIENT (INPATIENT)
Facility: HOSPITAL | Age: 84
LOS: 2 days | Discharge: NURSING, REHAB OR LONG-TERM CARE | End: 2019-02-16
Attending: FAMILY MEDICINE | Admitting: FAMILY MEDICINE
Payer: MEDICARE

## 2019-02-13 VITALS
WEIGHT: 130.07 LBS | DIASTOLIC BLOOD PRESSURE: 63 MMHG | SYSTOLIC BLOOD PRESSURE: 148 MMHG | RESPIRATION RATE: 18 BRPM | HEART RATE: 67 BPM | HEIGHT: 66 IN | TEMPERATURE: 99 F | OXYGEN SATURATION: 100 %

## 2019-02-13 PROBLEM — K21.9 GASTRO-ESOPHAGEAL REFLUX DISEASE WITHOUT ESOPHAGITIS: Chronic | Status: ACTIVE | Noted: 2019-02-07

## 2019-02-13 LAB
ALBUMIN SERPL ELPH-MCNC: 3.5 G/DL — SIGNIFICANT CHANGE UP (ref 3.3–5)
ALP SERPL-CCNC: 115 U/L — SIGNIFICANT CHANGE UP (ref 40–120)
ALT FLD-CCNC: 8 U/L — LOW (ref 12–78)
ANION GAP SERPL CALC-SCNC: 7 MMOL/L — SIGNIFICANT CHANGE UP (ref 5–17)
APPEARANCE UR: ABNORMAL
APTT BLD: 29.6 SEC — SIGNIFICANT CHANGE UP (ref 27.5–36.3)
AST SERPL-CCNC: 13 U/L — LOW (ref 15–37)
BACTERIA # UR AUTO: ABNORMAL
BASOPHILS # BLD AUTO: 0.05 K/UL — SIGNIFICANT CHANGE UP (ref 0–0.2)
BASOPHILS NFR BLD AUTO: 0.6 % — SIGNIFICANT CHANGE UP (ref 0–2)
BILIRUB SERPL-MCNC: 0.4 MG/DL — SIGNIFICANT CHANGE UP (ref 0.2–1.2)
BILIRUB UR-MCNC: NEGATIVE — SIGNIFICANT CHANGE UP
BUN SERPL-MCNC: 36 MG/DL — HIGH (ref 7–23)
CALCIUM SERPL-MCNC: 9 MG/DL — SIGNIFICANT CHANGE UP (ref 8.5–10.1)
CHLORIDE SERPL-SCNC: 107 MMOL/L — SIGNIFICANT CHANGE UP (ref 96–108)
CO2 SERPL-SCNC: 29 MMOL/L — SIGNIFICANT CHANGE UP (ref 22–31)
COLOR SPEC: YELLOW — SIGNIFICANT CHANGE UP
CREAT SERPL-MCNC: 1.36 MG/DL — HIGH (ref 0.5–1.3)
DIFF PNL FLD: ABNORMAL
EOSINOPHIL # BLD AUTO: 0.06 K/UL — SIGNIFICANT CHANGE UP (ref 0–0.5)
EOSINOPHIL NFR BLD AUTO: 0.8 % — SIGNIFICANT CHANGE UP (ref 0–6)
EPI CELLS # UR: SIGNIFICANT CHANGE UP
GLUCOSE SERPL-MCNC: 138 MG/DL — HIGH (ref 70–99)
GLUCOSE UR QL: NEGATIVE MG/DL — SIGNIFICANT CHANGE UP
HCT VFR BLD CALC: 35.4 % — LOW (ref 39–50)
HGB BLD-MCNC: 11.3 G/DL — LOW (ref 13–17)
IMM GRANULOCYTES NFR BLD AUTO: 0.3 % — SIGNIFICANT CHANGE UP (ref 0–1.5)
INR BLD: 1.1 RATIO — SIGNIFICANT CHANGE UP (ref 0.88–1.16)
KETONES UR-MCNC: ABNORMAL
LEUKOCYTE ESTERASE UR-ACNC: ABNORMAL
LYMPHOCYTES # BLD AUTO: 0.86 K/UL — LOW (ref 1–3.3)
LYMPHOCYTES # BLD AUTO: 11 % — LOW (ref 13–44)
MCHC RBC-ENTMCNC: 31.9 GM/DL — LOW (ref 32–36)
MCHC RBC-ENTMCNC: 33.3 PG — SIGNIFICANT CHANGE UP (ref 27–34)
MCV RBC AUTO: 104.4 FL — HIGH (ref 80–100)
MONOCYTES # BLD AUTO: 0.58 K/UL — SIGNIFICANT CHANGE UP (ref 0–0.9)
MONOCYTES NFR BLD AUTO: 7.4 % — SIGNIFICANT CHANGE UP (ref 2–14)
NEUTROPHILS # BLD AUTO: 6.24 K/UL — SIGNIFICANT CHANGE UP (ref 1.8–7.4)
NEUTROPHILS NFR BLD AUTO: 79.9 % — HIGH (ref 43–77)
NITRITE UR-MCNC: NEGATIVE — SIGNIFICANT CHANGE UP
NRBC # BLD: 0 /100 WBCS — SIGNIFICANT CHANGE UP (ref 0–0)
PH UR: 6 — SIGNIFICANT CHANGE UP (ref 5–8)
PLATELET # BLD AUTO: 215 K/UL — SIGNIFICANT CHANGE UP (ref 150–400)
POTASSIUM SERPL-MCNC: 5.1 MMOL/L — SIGNIFICANT CHANGE UP (ref 3.5–5.3)
POTASSIUM SERPL-SCNC: 5.1 MMOL/L — SIGNIFICANT CHANGE UP (ref 3.5–5.3)
PROT SERPL-MCNC: 6.8 GM/DL — SIGNIFICANT CHANGE UP (ref 6–8.3)
PROT UR-MCNC: 30 MG/DL
PROTHROM AB SERPL-ACNC: 12.3 SEC — SIGNIFICANT CHANGE UP (ref 10–12.9)
RBC # BLD: 3.39 M/UL — LOW (ref 4.2–5.8)
RBC # FLD: 12.2 % — SIGNIFICANT CHANGE UP (ref 10.3–14.5)
RBC CASTS # UR COMP ASSIST: ABNORMAL /HPF (ref 0–4)
SODIUM SERPL-SCNC: 143 MMOL/L — SIGNIFICANT CHANGE UP (ref 135–145)
SP GR SPEC: 1.01 — SIGNIFICANT CHANGE UP (ref 1.01–1.02)
TROPONIN I SERPL-MCNC: <0.015 NG/ML — SIGNIFICANT CHANGE UP (ref 0.01–0.04)
UROBILINOGEN FLD QL: NEGATIVE MG/DL — SIGNIFICANT CHANGE UP
WBC # BLD: 7.81 K/UL — SIGNIFICANT CHANGE UP (ref 3.8–10.5)
WBC # FLD AUTO: 7.81 K/UL — SIGNIFICANT CHANGE UP (ref 3.8–10.5)
WBC UR QL: ABNORMAL

## 2019-02-13 PROCEDURE — 71045 X-RAY EXAM CHEST 1 VIEW: CPT | Mod: 26

## 2019-02-13 PROCEDURE — 93010 ELECTROCARDIOGRAM REPORT: CPT

## 2019-02-13 PROCEDURE — 70450 CT HEAD/BRAIN W/O DYE: CPT | Mod: 26

## 2019-02-13 PROCEDURE — 99285 EMERGENCY DEPT VISIT HI MDM: CPT

## 2019-02-13 RX ORDER — PANTOPRAZOLE SODIUM 20 MG/1
40 TABLET, DELAYED RELEASE ORAL
Qty: 0 | Refills: 0 | Status: DISCONTINUED | OUTPATIENT
Start: 2019-02-13 | End: 2019-02-16

## 2019-02-13 RX ORDER — CARBIDOPA AND LEVODOPA 25; 100 MG/1; MG/1
4 TABLET ORAL
Qty: 0 | Refills: 0 | COMMUNITY

## 2019-02-13 RX ORDER — CARBIDOPA AND LEVODOPA 25; 100 MG/1; MG/1
3 TABLET ORAL
Qty: 0 | Refills: 0 | COMMUNITY

## 2019-02-13 RX ORDER — CARBIDOPA AND LEVODOPA 25; 100 MG/1; MG/1
2 TABLET ORAL
Qty: 0 | Refills: 0 | Status: DISCONTINUED | OUTPATIENT
Start: 2019-02-13 | End: 2019-02-16

## 2019-02-13 RX ORDER — CARBIDOPA AND LEVODOPA 25; 100 MG/1; MG/1
3 TABLET ORAL
Qty: 0 | Refills: 0 | Status: DISCONTINUED | OUTPATIENT
Start: 2019-02-13 | End: 2019-02-14

## 2019-02-13 RX ORDER — LOSARTAN POTASSIUM 100 MG/1
12.5 TABLET, FILM COATED ORAL DAILY
Qty: 0 | Refills: 0 | Status: DISCONTINUED | OUTPATIENT
Start: 2019-02-13 | End: 2019-02-16

## 2019-02-13 RX ORDER — RASAGILINE 0.5 MG/1
1 TABLET ORAL DAILY
Qty: 0 | Refills: 0 | Status: DISCONTINUED | OUTPATIENT
Start: 2019-02-13 | End: 2019-02-16

## 2019-02-13 RX ORDER — CEFTRIAXONE 500 MG/1
INJECTION, POWDER, FOR SOLUTION INTRAMUSCULAR; INTRAVENOUS
Qty: 0 | Refills: 0 | Status: DISCONTINUED | OUTPATIENT
Start: 2019-02-13 | End: 2019-02-16

## 2019-02-13 RX ORDER — ASPIRIN/CALCIUM CARB/MAGNESIUM 324 MG
81 TABLET ORAL DAILY
Qty: 0 | Refills: 0 | Status: DISCONTINUED | OUTPATIENT
Start: 2019-02-13 | End: 2019-02-14

## 2019-02-13 RX ORDER — HEPARIN SODIUM 5000 [USP'U]/ML
5000 INJECTION INTRAVENOUS; SUBCUTANEOUS EVERY 12 HOURS
Qty: 0 | Refills: 0 | Status: DISCONTINUED | OUTPATIENT
Start: 2019-02-13 | End: 2019-02-13

## 2019-02-13 RX ORDER — DOCUSATE SODIUM 100 MG
100 CAPSULE ORAL THREE TIMES A DAY
Qty: 0 | Refills: 0 | Status: DISCONTINUED | OUTPATIENT
Start: 2019-02-13 | End: 2019-02-16

## 2019-02-13 RX ORDER — MIRTAZAPINE 45 MG/1
30 TABLET, ORALLY DISINTEGRATING ORAL AT BEDTIME
Qty: 0 | Refills: 0 | Status: DISCONTINUED | OUTPATIENT
Start: 2019-02-13 | End: 2019-02-16

## 2019-02-13 RX ORDER — TAMSULOSIN HYDROCHLORIDE 0.4 MG/1
0.4 CAPSULE ORAL AT BEDTIME
Qty: 0 | Refills: 0 | Status: DISCONTINUED | OUTPATIENT
Start: 2019-02-13 | End: 2019-02-16

## 2019-02-13 RX ORDER — PIMAVANSERIN TARTRATE 10 MG/1
1 TABLET, COATED ORAL
Qty: 0 | Refills: 0 | COMMUNITY

## 2019-02-13 RX ORDER — SIMVASTATIN 20 MG/1
20 TABLET, FILM COATED ORAL AT BEDTIME
Qty: 0 | Refills: 0 | Status: DISCONTINUED | OUTPATIENT
Start: 2019-02-13 | End: 2019-02-16

## 2019-02-13 RX ORDER — CEFTRIAXONE 500 MG/1
1000 INJECTION, POWDER, FOR SOLUTION INTRAMUSCULAR; INTRAVENOUS EVERY 24 HOURS
Qty: 0 | Refills: 0 | Status: DISCONTINUED | OUTPATIENT
Start: 2019-02-14 | End: 2019-02-16

## 2019-02-13 RX ORDER — CEFTRIAXONE 500 MG/1
1000 INJECTION, POWDER, FOR SOLUTION INTRAMUSCULAR; INTRAVENOUS ONCE
Qty: 0 | Refills: 0 | Status: COMPLETED | OUTPATIENT
Start: 2019-02-13 | End: 2019-02-13

## 2019-02-13 RX ORDER — CEFTRIAXONE 500 MG/1
INJECTION, POWDER, FOR SOLUTION INTRAMUSCULAR; INTRAVENOUS
Qty: 0 | Refills: 0 | Status: DISCONTINUED | OUTPATIENT
Start: 2019-02-13 | End: 2019-02-13

## 2019-02-13 RX ORDER — CARBIDOPA AND LEVODOPA 25; 100 MG/1; MG/1
4 TABLET ORAL
Qty: 0 | Refills: 0 | Status: DISCONTINUED | OUTPATIENT
Start: 2019-02-13 | End: 2019-02-14

## 2019-02-13 RX ORDER — DONEPEZIL HYDROCHLORIDE 10 MG/1
5 TABLET, FILM COATED ORAL AT BEDTIME
Qty: 0 | Refills: 0 | Status: DISCONTINUED | OUTPATIENT
Start: 2019-02-13 | End: 2019-02-16

## 2019-02-13 RX ORDER — VALACYCLOVIR 500 MG/1
500 TABLET, FILM COATED ORAL DAILY
Qty: 0 | Refills: 0 | Status: DISCONTINUED | OUTPATIENT
Start: 2019-02-13 | End: 2019-02-16

## 2019-02-13 RX ORDER — LATANOPROST 0.05 MG/ML
1 SOLUTION/ DROPS OPHTHALMIC; TOPICAL AT BEDTIME
Qty: 0 | Refills: 0 | Status: DISCONTINUED | OUTPATIENT
Start: 2019-02-13 | End: 2019-02-16

## 2019-02-13 RX ORDER — HEPARIN SODIUM 5000 [USP'U]/ML
5000 INJECTION INTRAVENOUS; SUBCUTANEOUS EVERY 8 HOURS
Qty: 0 | Refills: 0 | Status: DISCONTINUED | OUTPATIENT
Start: 2019-02-13 | End: 2019-02-16

## 2019-02-13 RX ORDER — ACETAMINOPHEN 500 MG
650 TABLET ORAL EVERY 6 HOURS
Qty: 0 | Refills: 0 | Status: DISCONTINUED | OUTPATIENT
Start: 2019-02-13 | End: 2019-02-15

## 2019-02-13 RX ORDER — SODIUM CHLORIDE 9 MG/ML
1000 INJECTION INTRAMUSCULAR; INTRAVENOUS; SUBCUTANEOUS ONCE
Qty: 0 | Refills: 0 | Status: COMPLETED | OUTPATIENT
Start: 2019-02-13 | End: 2019-02-13

## 2019-02-13 RX ADMIN — SODIUM CHLORIDE 1000 MILLILITER(S): 9 INJECTION INTRAMUSCULAR; INTRAVENOUS; SUBCUTANEOUS at 21:29

## 2019-02-13 NOTE — H&P ADULT - ASSESSMENT
85M.  admitted 02/13/19.  presented to ED from home due to generalized weakness.  patient recently started risperidone for help with agitation.  since, he has been talking to people who weren't there.  his legs gave out while ambulating with his son.  unable to care for him at home anymore and would like to discuss placement options.  in ED, UA (+) pyuria.  UCx obtained.  given levofloxacin.    PMHx:  DM;  HTN;  HLD;  BPH;  renal CA;  PD;  dementia;  anxiety/depression.    UTI.  -UCx.  -ceftriaxone.    advanced PD w/ progressive debility.  -PT.  -case management.    DVT prophylaxis.  -UFH sq.    disposition.  -general medical varela.    communication.  -RN.

## 2019-02-13 NOTE — H&P ADULT - NSHPPHYSICALEXAM_GEN_ALL_CORE
Vital Signs Last 24 Hrs  T(C): 37.1 (13 Feb 2019 15:39), Max: 37.1 (13 Feb 2019 15:39)  T(F): 98.8 (13 Feb 2019 15:39), Max: 98.8 (13 Feb 2019 15:39)  HR: 80 (13 Feb 2019 19:38) (67 - 80)  BP: 157/70 (13 Feb 2019 19:38) (148/63 - 157/70)  BP(mean): --  RR: 18 (13 Feb 2019 19:38) (18 - 18)  SpO2: 97% (13 Feb 2019 19:38) (97% - 100%)    Constitutional: confused.   HEENT: PERR, EOMI, Normal Hearing, MMM  Neck: Soft and supple, No LAD, No JVD  Respiratory: Breath sounds are clear bilaterally, No wheezing, rales or rhonchi  Cardiovascular: S1 and S2, regular rate and rhythm.  Gastrointestinal: Bowel Sounds present, soft, nontender, nondistended, no guarding, no rebound  Extremities: No peripheral edema  Vascular: 2+ peripheral pulses  Neurological: A/O x 0, no focal deficits  Musculoskeletal: unable to test due to not cooperating however can move all limbs spontaneously.   Skin: No rashes

## 2019-02-13 NOTE — ED PROVIDER NOTE - OBJECTIVE STATEMENT
84 y/o male with a PMHx of Parkinson's, DM, GERD, HTN, HLD, s/p nephrectomy presents to the ED c/o generalized weakness. Pt started Risperidone last night to help with agitation and since taking it he has been talking to people that weren't there. Pt legs also gave out on him while walking with his son. This morning pt had 1 episode of coughing up green sputum. No fever. PMD: Dr. Herve Concepcion. Hx obtained by son at bedside. Hx limited secondary to pt's dementia.

## 2019-02-13 NOTE — H&P ADULT - HISTORY OF PRESENT ILLNESS
85M.  admitted 02/13/19.  presented to ED from home due to generalized weakness.  progressive.  increased confusion and hallucinations.   his legs gave out while ambulating with his son.  unable to care for him at home anymore and would like to discuss placement options.  in ED, UA (+) pyuria.  UCx obtained.  given levofloxacin.    ROS:  unobtainable due to dementia.    PMHx:  DM;  HTN;  HLD;  BPH;  renal CA;  PD;  dementia;  anxiety/depression.    PSHx:  b/l cataracts;  nephrectomy;  inguinal hernia repair.    ALL:  morphine.    Rx:  reviewed.    SocHx:  lives in the community with his family.  no tobacco, EtOH or illegal drugs.    FHx:  as per son no pertinent family hx of cardiac or neurological problems.

## 2019-02-14 LAB
ANION GAP SERPL CALC-SCNC: 6 MMOL/L — SIGNIFICANT CHANGE UP (ref 5–17)
BUN SERPL-MCNC: 33 MG/DL — HIGH (ref 7–23)
CALCIUM SERPL-MCNC: 8.8 MG/DL — SIGNIFICANT CHANGE UP (ref 8.5–10.1)
CHLORIDE SERPL-SCNC: 107 MMOL/L — SIGNIFICANT CHANGE UP (ref 96–108)
CO2 SERPL-SCNC: 29 MMOL/L — SIGNIFICANT CHANGE UP (ref 22–31)
CREAT SERPL-MCNC: 1.29 MG/DL — SIGNIFICANT CHANGE UP (ref 0.5–1.3)
CULTURE RESULTS: NO GROWTH — SIGNIFICANT CHANGE UP
GLUCOSE SERPL-MCNC: 154 MG/DL — HIGH (ref 70–99)
POTASSIUM SERPL-MCNC: 4.4 MMOL/L — SIGNIFICANT CHANGE UP (ref 3.5–5.3)
POTASSIUM SERPL-SCNC: 4.4 MMOL/L — SIGNIFICANT CHANGE UP (ref 3.5–5.3)
SODIUM SERPL-SCNC: 142 MMOL/L — SIGNIFICANT CHANGE UP (ref 135–145)
SPECIMEN SOURCE: SIGNIFICANT CHANGE UP

## 2019-02-14 RX ORDER — LOSARTAN POTASSIUM 100 MG/1
0.5 TABLET, FILM COATED ORAL
Qty: 0 | Refills: 0 | COMMUNITY

## 2019-02-14 RX ORDER — CARBIDOPA AND LEVODOPA 25; 100 MG/1; MG/1
2 TABLET ORAL
Qty: 0 | Refills: 0 | COMMUNITY

## 2019-02-14 RX ORDER — CHOLECALCIFEROL (VITAMIN D3) 125 MCG
1 CAPSULE ORAL
Qty: 0 | Refills: 0 | COMMUNITY

## 2019-02-14 RX ORDER — RASAGILINE 0.5 MG/1
1 TABLET ORAL
Qty: 0 | Refills: 0 | COMMUNITY

## 2019-02-14 RX ORDER — DONEPEZIL HYDROCHLORIDE 10 MG/1
1 TABLET, FILM COATED ORAL
Qty: 0 | Refills: 0 | COMMUNITY

## 2019-02-14 RX ORDER — SIMVASTATIN 20 MG/1
1 TABLET, FILM COATED ORAL
Qty: 0 | Refills: 0 | COMMUNITY

## 2019-02-14 RX ORDER — ASPIRIN/CALCIUM CARB/MAGNESIUM 324 MG
1 TABLET ORAL
Qty: 0 | Refills: 0 | COMMUNITY

## 2019-02-14 RX ORDER — LATANOPROST 0.05 MG/ML
1 SOLUTION/ DROPS OPHTHALMIC; TOPICAL
Qty: 0 | Refills: 0 | COMMUNITY

## 2019-02-14 RX ORDER — MIRTAZAPINE 45 MG/1
1 TABLET, ORALLY DISINTEGRATING ORAL
Qty: 0 | Refills: 0 | COMMUNITY

## 2019-02-14 RX ORDER — VALACYCLOVIR 500 MG/1
1 TABLET, FILM COATED ORAL
Qty: 0 | Refills: 0 | COMMUNITY

## 2019-02-14 RX ORDER — DOCUSATE SODIUM 100 MG
1 CAPSULE ORAL
Qty: 0 | Refills: 0 | COMMUNITY

## 2019-02-14 RX ORDER — TAMSULOSIN HYDROCHLORIDE 0.4 MG/1
1 CAPSULE ORAL
Qty: 0 | Refills: 0 | COMMUNITY

## 2019-02-14 RX ORDER — CARBIDOPA AND LEVODOPA 25; 100 MG/1; MG/1
3 TABLET ORAL
Qty: 0 | Refills: 0 | COMMUNITY

## 2019-02-14 RX ORDER — OMEPRAZOLE 10 MG/1
1 CAPSULE, DELAYED RELEASE ORAL
Qty: 0 | Refills: 0 | COMMUNITY

## 2019-02-14 RX ORDER — CARBIDOPA AND LEVODOPA 25; 100 MG/1; MG/1
3 TABLET ORAL
Qty: 0 | Refills: 0 | Status: DISCONTINUED | OUTPATIENT
Start: 2019-02-14 | End: 2019-02-16

## 2019-02-14 RX ORDER — ASPIRIN/CALCIUM CARB/MAGNESIUM 324 MG
81 TABLET ORAL
Qty: 0 | Refills: 0 | Status: DISCONTINUED | OUTPATIENT
Start: 2019-02-14 | End: 2019-02-16

## 2019-02-14 RX ORDER — ACETAMINOPHEN 500 MG
2 TABLET ORAL
Qty: 0 | Refills: 0 | COMMUNITY

## 2019-02-14 RX ORDER — QUETIAPINE FUMARATE 200 MG/1
12.5 TABLET, FILM COATED ORAL EVERY 12 HOURS
Qty: 0 | Refills: 0 | Status: DISCONTINUED | OUTPATIENT
Start: 2019-02-14 | End: 2019-02-15

## 2019-02-14 RX ADMIN — CEFTRIAXONE 1000 MILLIGRAM(S): 500 INJECTION, POWDER, FOR SOLUTION INTRAMUSCULAR; INTRAVENOUS at 21:51

## 2019-02-14 RX ADMIN — TAMSULOSIN HYDROCHLORIDE 0.4 MILLIGRAM(S): 0.4 CAPSULE ORAL at 21:51

## 2019-02-14 RX ADMIN — CARBIDOPA AND LEVODOPA 3 CAPSULE(S): 25; 100 TABLET ORAL at 16:26

## 2019-02-14 RX ADMIN — DONEPEZIL HYDROCHLORIDE 5 MILLIGRAM(S): 10 TABLET, FILM COATED ORAL at 21:51

## 2019-02-14 RX ADMIN — CARBIDOPA AND LEVODOPA 4 CAPSULE(S): 25; 100 TABLET ORAL at 11:44

## 2019-02-14 RX ADMIN — CEFTRIAXONE 1000 MILLIGRAM(S): 500 INJECTION, POWDER, FOR SOLUTION INTRAMUSCULAR; INTRAVENOUS at 00:48

## 2019-02-14 RX ADMIN — Medication 100 MILLIGRAM(S): at 16:25

## 2019-02-14 RX ADMIN — QUETIAPINE FUMARATE 12.5 MILLIGRAM(S): 200 TABLET, FILM COATED ORAL at 21:51

## 2019-02-14 RX ADMIN — MIRTAZAPINE 30 MILLIGRAM(S): 45 TABLET, ORALLY DISINTEGRATING ORAL at 21:51

## 2019-02-14 RX ADMIN — HEPARIN SODIUM 5000 UNIT(S): 5000 INJECTION INTRAVENOUS; SUBCUTANEOUS at 21:51

## 2019-02-14 RX ADMIN — VALACYCLOVIR 500 MILLIGRAM(S): 500 TABLET, FILM COATED ORAL at 11:44

## 2019-02-14 RX ADMIN — SIMVASTATIN 20 MILLIGRAM(S): 20 TABLET, FILM COATED ORAL at 21:51

## 2019-02-14 RX ADMIN — CARBIDOPA AND LEVODOPA 2 CAPSULE(S): 25; 100 TABLET ORAL at 21:31

## 2019-02-14 RX ADMIN — HEPARIN SODIUM 5000 UNIT(S): 5000 INJECTION INTRAVENOUS; SUBCUTANEOUS at 16:25

## 2019-02-14 RX ADMIN — Medication 81 MILLIGRAM(S): at 11:43

## 2019-02-14 RX ADMIN — HEPARIN SODIUM 5000 UNIT(S): 5000 INJECTION INTRAVENOUS; SUBCUTANEOUS at 00:49

## 2019-02-14 NOTE — PHARMACOTHERAPY INTERVENTION NOTE - COMMENTS
Spoke to patient's son, Jorge to obtain medication reconciliation  *Verified Rytary times as 3 tabs [0700, 1100, 1500], 2 tabs [1900]

## 2019-02-14 NOTE — PROGRESS NOTE ADULT - SUBJECTIVE AND OBJECTIVE BOX
cc: Weakness	  History of Present Illness: 	  85M.  admitted 19.  presented to ED from home due to generalized weakness.  progressive.  increased confusion and hallucinations.   his legs gave out while ambulating with his son.  unable to care for him at home anymore and would like to discuss placement options.  in ED, UA (+) pyuria.  UCx obtained.  given levofloxacin.      : Pt sitting in chair with son at bedside.  Pt poor historian and offers no specific complaints. Spoke with son who understands plan is to hydrate and treat underlying UTI as well as PT evaluation.    ROS:  unobtainable due to dementia.    Physical Exam:  Vital Signs Last 24 Hrs T(C): 36.4 (2019 11:43), Max: 37.1 (2019 15:39) T(F): 97.5 (2019 11:43), Max: 98.8 (2019 15:39) HR: 67 (2019 11:43) (67 - 80) BP: 123/47 (2019 11:43) (123/47 - 160/60) BP(mean): -- RR: 20 (2019 11:43) (18 - 20) SpO2: 99% (2019 11:43) (94% - 100%)  Constitutional: confused, frail, elderly  HEENT: PERR, EOMI, Normal Hearing, MMM  Neck: Soft and supple, No LAD, No JVD  Respiratory: Breath sounds are clear bilaterally, No wheezing, rales or rhonchi  Cardiovascular: S1 and S2, regular rate and rhythm.  Gastrointestinal: Bowel Sounds present, soft, nontender, nondistended, no guarding, no rebound  Extremities: No peripheral edema  Vascular: 2+ peripheral pulses  Neurological: A/O x 0, no focal deficits Skin: No rashes	    MEDICATIONS  (STANDING):  aspirin  chewable 81 milliGRAM(s) Oral daily  carbidopa 36.25 mG/levodopa 145 mG ER 4 Capsule(s) Oral <User Schedule>  carbidopa 36.25 mG/levodopa 145 mG ER 3 Capsule(s) Oral <User Schedule>  carbidopa 36.25 mG/levodopa 145 mG ER 2 Capsule(s) Oral <User Schedule>  cefTRIAXone Injectable.      cefTRIAXone Injectable. 1000 milliGRAM(s) IV Push every 24 hours  docusate sodium 100 milliGRAM(s) Oral three times a day  donepezil 5 milliGRAM(s) Oral at bedtime  heparin  Injectable 5000 Unit(s) SubCutaneous every 8 hours  latanoprost 0.005% Ophthalmic Solution 1 Drop(s) Right EYE at bedtime  losartan 12.5 milliGRAM(s) Oral daily  mirtazapine 30 milliGRAM(s) Oral at bedtime  pantoprazole    Tablet 40 milliGRAM(s) Oral before breakfast  rasagiline Tablet 1 milliGRAM(s) Oral daily  simvastatin 20 milliGRAM(s) Oral at bedtime  tamsulosin 0.4 milliGRAM(s) Oral at bedtime  valACYclovir 500 milliGRAM(s) Oral daily    MEDICATIONS  (PRN):  acetaminophen   Tablet .. 650 milliGRAM(s) Oral every 6 hours PRN Temp greater or equal to 38.5C (101.3F), Mild Pain (1 - 3)    CARDIAC MARKERS ( 2019 16:20 )  <0.015 ng/mL / x     / x     / x     / x                                11.3   7.81  )-----------( 215      ( 2019 16:20 )             35.4     02-14    142  |  107  |  33<H>  ----------------------------<  154<H>  4.4   |  29  |  1.29    Ca    8.8      2019 12:56    TPro  6.8  /  Alb  3.5  /  TBili  0.4  /  DBili  x   /  AST  13<L>  /  ALT  8<L>  /  AlkPhos  115  02-13    CAPILLARY BLOOD GLUCOSE        LIVER FUNCTIONS - ( 2019 16:20 )  Alb: 3.5 g/dL / Pro: 6.8 gm/dL / ALK PHOS: 115 U/L / ALT: 8 U/L / AST: 13 U/L / GGT: x           PT/INR - ( 2019 16:20 )   PT: 12.3 sec;   INR: 1.10 ratio         PTT - ( 2019 16:20 )  PTT:29.6 sec  Urinalysis Basic - ( 2019 19:00 )    Color: Yellow / Appearance: Slightly Turbid / S.010 / pH: x  Gluc: x / Ketone: Trace  / Bili: Negative / Urobili: Negative mg/dL   Blood: x / Protein: 30 mg/dL / Nitrite: Negative   Leuk Esterase: Moderate / RBC: 3-5 /HPF / WBC 26-50   Sq Epi: x / Non Sq Epi: Occasional / Bacteria: Few      Assessment and Plan:  85M.  admitted 19.  presented to ED from home due to generalized weakness.     UTI with acute metabolic encephalopathy in setting of dementia:  -c/w ceftriaxone  -f/u urine and blood cultures  -supportive care, seroquel PRN    Dehydration, LIZBETH:  -resolving with IVFs    advanced PD w/ progressive debility and dementia.  -c/w home meds  -CT head no acute pathology  -nutrition eval for malnutrition  -supportive care  -PT    anemia: stable    DVT prophylaxis.  -UFH sq.

## 2019-02-14 NOTE — PHYSICAL THERAPY INITIAL EVALUATION ADULT - MODALITIES TREATMENT COMMENTS
pt left seated in chair post Eval; chair alarm donned; son Chino present; callbell in reach; pt instructed not to get up alone; call nursing for assist; rodrigo well; denied pain; RN Toma made aware pt OOB

## 2019-02-15 RX ORDER — QUETIAPINE FUMARATE 200 MG/1
25 TABLET, FILM COATED ORAL
Qty: 0 | Refills: 0 | Status: DISCONTINUED | OUTPATIENT
Start: 2019-02-15 | End: 2019-02-16

## 2019-02-15 RX ORDER — ACETAMINOPHEN 500 MG
650 TABLET ORAL EVERY 6 HOURS
Qty: 0 | Refills: 0 | Status: DISCONTINUED | OUTPATIENT
Start: 2019-02-15 | End: 2019-02-16

## 2019-02-15 RX ADMIN — CARBIDOPA AND LEVODOPA 3 CAPSULE(S): 25; 100 TABLET ORAL at 06:01

## 2019-02-15 RX ADMIN — QUETIAPINE FUMARATE 25 MILLIGRAM(S): 200 TABLET, FILM COATED ORAL at 20:09

## 2019-02-15 RX ADMIN — PANTOPRAZOLE SODIUM 40 MILLIGRAM(S): 20 TABLET, DELAYED RELEASE ORAL at 06:02

## 2019-02-15 RX ADMIN — MIRTAZAPINE 30 MILLIGRAM(S): 45 TABLET, ORALLY DISINTEGRATING ORAL at 22:01

## 2019-02-15 RX ADMIN — LOSARTAN POTASSIUM 12.5 MILLIGRAM(S): 100 TABLET, FILM COATED ORAL at 06:02

## 2019-02-15 RX ADMIN — CARBIDOPA AND LEVODOPA 3 CAPSULE(S): 25; 100 TABLET ORAL at 15:16

## 2019-02-15 RX ADMIN — RASAGILINE 1 MILLIGRAM(S): 0.5 TABLET ORAL at 11:37

## 2019-02-15 RX ADMIN — HEPARIN SODIUM 5000 UNIT(S): 5000 INJECTION INTRAVENOUS; SUBCUTANEOUS at 06:02

## 2019-02-15 RX ADMIN — Medication 100 MILLIGRAM(S): at 06:02

## 2019-02-15 RX ADMIN — CARBIDOPA AND LEVODOPA 3 CAPSULE(S): 25; 100 TABLET ORAL at 11:37

## 2019-02-15 RX ADMIN — DONEPEZIL HYDROCHLORIDE 5 MILLIGRAM(S): 10 TABLET, FILM COATED ORAL at 22:01

## 2019-02-15 RX ADMIN — CEFTRIAXONE 1000 MILLIGRAM(S): 500 INJECTION, POWDER, FOR SOLUTION INTRAMUSCULAR; INTRAVENOUS at 22:00

## 2019-02-15 RX ADMIN — VALACYCLOVIR 500 MILLIGRAM(S): 500 TABLET, FILM COATED ORAL at 11:38

## 2019-02-15 RX ADMIN — CARBIDOPA AND LEVODOPA 2 CAPSULE(S): 25; 100 TABLET ORAL at 19:08

## 2019-02-15 RX ADMIN — Medication 100 MILLIGRAM(S): at 15:21

## 2019-02-15 NOTE — DIETITIAN INITIAL EVALUATION ADULT. - PHYSICAL APPEARANCE
BMI 21; NFPE significant for muscle wasting: severe: temporal, clavicle, deltoid, scapular, thigh, and patellar regions; fat depletion: severe: occipital and triceps./underweight

## 2019-02-15 NOTE — DIETITIAN INITIAL EVALUATION ADULT. - PERTINENT MEDS FT
MEDICATIONS  (STANDING):  acetaminophen   Tablet .. 650 milliGRAM(s) Oral every 6 hours  aspirin enteric coated 81 milliGRAM(s) Oral <User Schedule>  carbidopa 36.25 mG/levodopa 145 mG ER 3 Capsule(s) Oral <User Schedule>  carbidopa 36.25 mG/levodopa 145 mG ER 2 Capsule(s) Oral <User Schedule>  cefTRIAXone Injectable.      cefTRIAXone Injectable. 1000 milliGRAM(s) IV Push every 24 hours  docusate sodium 100 milliGRAM(s) Oral three times a day  donepezil 5 milliGRAM(s) Oral at bedtime  heparin  Injectable 5000 Unit(s) SubCutaneous every 8 hours  latanoprost 0.005% Ophthalmic Solution 1 Drop(s) Right EYE at bedtime  losartan 12.5 milliGRAM(s) Oral daily  mirtazapine 30 milliGRAM(s) Oral at bedtime  pantoprazole    Tablet 40 milliGRAM(s) Oral before breakfast  QUEtiapine 25 milliGRAM(s) Oral two times a day  rasagiline Tablet 1 milliGRAM(s) Oral daily  simvastatin 20 milliGRAM(s) Oral at bedtime  tamsulosin 0.4 milliGRAM(s) Oral at bedtime  valACYclovir 500 milliGRAM(s) Oral daily    MEDICATIONS  (PRN):

## 2019-02-15 NOTE — DIETITIAN INITIAL EVALUATION ADULT. - OTHER INFO
PT seen as consult for unspecified assessment. PT is an 84 yo M w PMH DM, HTN, HLD, BPH, renal CA, PD, dementia, anxiety/depression p/w generalized weakness (pts legs gave out from under him when ambulating w son, progressive and increased confusion and hallucinations. Noted son feels he is unable to care for him at home anymore and would like to discuss placement options. Upon observation pt appears thin and NFPE significant for muscle wasting: severe: temporal, clavicle, deltoid, scapular, thigh, and patellar regions; fat depletion: severe: occipital and triceps. Son reports # ~1 year ago and pt has lost about 20# over the past year. C/w admission wt. Wt loss is not clin sig however. Pt noted w mild edema r/l ankles. son who lives w pt provide dietary recall which reveals pt is eating 3 small meals daily w 1-2 glucernas daily. Based on recall pt is likely not meeting at least 75% of needs. Based on above pt meets criteria for severe malnutrition in the context of chronic illness. Pt noted w stage 1 on sacrum; prashant 15. Son reports that PO intake has been significantly declined while in the hospital. Pt provided w remeron which can increase appetite. Recommend monitoring to assess intake. Son reports pt has chronic constipation; last BM noted 2/13; currently on bowel regimen standing colace. No chewing/swallowing difficulties; however son reports that pt needs assistance w meats cut up and difficulty w leafy greens. Recommendations: 1. Provide glucerna TID and gelatein 1x/day 2. Provide maximum assistance and encouragement w meals/supplements. 3. Monitor intake and document in EMR. 4. Provide MVI w minerals 5. Encourage oral supplement between meals. PT seen as consult for unspecified assessment. PT is an 84 yo M w PMH DM, HTN, HLD, BPH, renal CA, PD, dementia, anxiety/depression p/w generalized weakness (pts legs gave out from under him when ambulating w son, progressive and increased confusion and hallucinations. Noted son feels he is unable to care for him at home anymore and would like to discuss placement options. Upon observation pt appears thin and NFPE significant for muscle wasting: severe: temporal, clavicle, deltoid, scapular, thigh, and patellar regions; fat depletion: severe: occipital and triceps. Son reports # ~1 year ago and pt has lost about 20# over the past year. C/w admission wt. Wt loss is not clin sig however. Pt noted w mild edema r/l ankles. son who lives w pt provide dietary recall which reveals pt is eating 3 small meals daily w 1-2 glucernas daily. Based on recall pt is likely not meeting at least 75% of needs. Based on above pt meets criteria for severe malnutrition in the context of chronic illness. Pt noted w stage 1 on sacrum; prashant 15. Son reports that PO intake has been significantly declined while in the hospital. Pt provided w remeron which can increase appetite. Recommend monitoring to assess intake. Son reports pt has chronic constipation; last BM noted 2/13; currently on bowel regimen standing colace. No chewing/swallowing difficulties; however son reports that pt needs assistance w meats cut up and difficulty w leafy greens. Recommendations: 1. Provide glucerna TID and gelatein 1x/day 2. Provide maximum assistance and encouragement w meals/supplements. 3. Monitor intake and document in EMR. 4. Provide MVI w minerals 5. Encourage oral supplement between meals. 6. Monitor wt weekly 7. Order a1c

## 2019-02-15 NOTE — DIETITIAN INITIAL EVALUATION ADULT. - ORAL INTAKE PTA
fair/son who lives w pt provide dietary recall which reveals pt is eating 3 small meals daily w 1-2 glucernas daily. Based on recall pt is likely not meeting at least 75% of needs

## 2019-02-15 NOTE — DIETITIAN INITIAL EVALUATION ADULT. - ADHERENCE
fair/son who provides food for pt reports trying to limit Na intake and limiting CHO intake; no A1c avail

## 2019-02-15 NOTE — PROGRESS NOTE ADULT - SUBJECTIVE AND OBJECTIVE BOX
cc: Weakness	  History of Present Illness: 	  85M.  admitted 19.  presented to ED from home due to generalized weakness.  progressive.  increased confusion and hallucinations.   his legs gave out while ambulating with his son.  unable to care for him at home anymore and would like to discuss placement options.  in ED, UA (+) pyuria.  UCx obtained.  given levofloxacin.      : Pt sitting in chair with son at bedside.  Pt poor historian and offers no specific complaints. Spoke with son who understands plan is to hydrate and treat underlying UTI as well as PT evaluation.  2/15: Pt lyring in bed, comfortable but confused.  Son at bedside concerned about his episodes of agitation.  Pt on 1:1 for safety.      ROS:  unobtainable due to dementia.    Physical Exam:  Vital Signs Last 24 Hrs T(C): 37 (15 Feb 2019 12:28), Max: 37 (15 Feb 2019 12:28) T(F): 98.6 (15 Feb 2019 12:28), Max: 98.6 (15 Feb 2019 12:28) HR: 88 (15 Feb 2019 12:28) (86 - 88) BP: 142/60 (15 Feb 2019 12:28) (141/90 - 142/60) BP(mean): -- RR: 20 (15 Feb 2019 12:28) (18 - 20) SpO2: 98% (15 Feb 2019 12:28) (95% - 98%)  Constitutional: confused, frail, elderly  HEENT: PERR, EOMI, Normal Hearing, MMM  Neck: Soft and supple, No LAD, No JVD  Respiratory: Breath sounds are clear bilaterally, No wheezing, rales or rhonchi  Cardiovascular: S1 and S2, regular rate and rhythm.  Gastrointestinal: Bowel Sounds present, soft, nontender, nondistended, no guarding, no rebound  Extremities: No peripheral edema  Vascular: 2+ peripheral pulses  Neurological: A/O x 0, no focal deficits Skin: No rashes	    MEDICATIONS  (STANDING):  acetaminophen   Tablet .. 650 milliGRAM(s) Oral every 6 hours  aspirin enteric coated 81 milliGRAM(s) Oral <User Schedule>  carbidopa 36.25 mG/levodopa 145 mG ER 3 Capsule(s) Oral <User Schedule>  carbidopa 36.25 mG/levodopa 145 mG ER 2 Capsule(s) Oral <User Schedule>  cefTRIAXone Injectable.      cefTRIAXone Injectable. 1000 milliGRAM(s) IV Push every 24 hours  docusate sodium 100 milliGRAM(s) Oral three times a day  donepezil 5 milliGRAM(s) Oral at bedtime  heparin  Injectable 5000 Unit(s) SubCutaneous every 8 hours  latanoprost 0.005% Ophthalmic Solution 1 Drop(s) Right EYE at bedtime  losartan 12.5 milliGRAM(s) Oral daily  mirtazapine 30 milliGRAM(s) Oral at bedtime  pantoprazole    Tablet 40 milliGRAM(s) Oral before breakfast  QUEtiapine 25 milliGRAM(s) Oral two times a day  rasagiline Tablet 1 milliGRAM(s) Oral daily  simvastatin 20 milliGRAM(s) Oral at bedtime  tamsulosin 0.4 milliGRAM(s) Oral at bedtime  valACYclovir 500 milliGRAM(s) Oral daily    MEDICATIONS  (PRN):    CARDIAC MARKERS ( 2019 16:20 )  <0.015 ng/mL / x     / x     / x     / x                                11.3   7.81  )-----------( 215      ( 2019 16:20 )             35.4     02-14    142  |  107  |  33<H>  ----------------------------<  154<H>  4.4   |  29  |  1.29    Ca    8.8      2019 12:56    TPro  6.8  /  Alb  3.5  /  TBili  0.4  /  DBili  x   /  AST  13<L>  /  ALT  8<L>  /  AlkPhos  115  02-13    CAPILLARY BLOOD GLUCOSE        LIVER FUNCTIONS - ( 2019 16:20 )  Alb: 3.5 g/dL / Pro: 6.8 gm/dL / ALK PHOS: 115 U/L / ALT: 8 U/L / AST: 13 U/L / GGT: x           PT/INR - ( 2019 16:20 )   PT: 12.3 sec;   INR: 1.10 ratio         PTT - ( 2019 16:20 )  PTT:29.6 sec  Urinalysis Basic - ( 2019 19:00 )    Color: Yellow / Appearance: Slightly Turbid / S.010 / pH: x  Gluc: x / Ketone: Trace  / Bili: Negative / Urobili: Negative mg/dL   Blood: x / Protein: 30 mg/dL / Nitrite: Negative   Leuk Esterase: Moderate / RBC: 3-5 /HPF / WBC 26-50   Sq Epi: x / Non Sq Epi: Occasional / Bacteria: Few      Assessment and Plan:  85M.  admitted 19.  presented to ED from home due to generalized weakness.     UTI with acute metabolic encephalopathy in setting of dementia vs progression of disease:  -ceftriaxone day # 3, stop further antibiotics given negative urine cultures.  -supportive care  Seroquel standing  -neuro eval for med management     advanced PD w/ progressive debility and dementia.  -c/w home meds  -CT head no acute pathology  -nutrition eval for malnutrition  -supportive care  -PT    Dehydration, LIZBETH:  -resolving with IVFs.  Encourage oral hydration.    anemia: stable    DVT prophylaxis.  -UFH sq.    Dispo:  Discharge to facility pending stability of behavioral disturbances.

## 2019-02-15 NOTE — CHART NOTE - NSCHARTNOTEFT_GEN_A_CORE
Upon Nutritional Assessment by the Registered Dietitian your patient was determined to meet criteria / has evidence of the following diagnosis/diagnoses:          [ ]  Mild Protein Calorie Malnutrition        [ ]  Moderate Protein Calorie Malnutrition        [x ] Severe Protein Calorie Malnutrition        [ ] Unspecified Protein Calorie Malnutrition        [ ] Underweight / BMI <19        [ ] Morbid Obesity / BMI > 40      Findings:    Upon observation pt appears thin and NFPE significant for muscle wasting: severe: temporal, clavicle, deltoid, scapular, thigh, and patellar regions; fat depletion: severe: occipital and triceps. Son reports # ~1 year ago and pt has lost about 20# over the past year. C/w admission wt. Wt loss is not clin sig however. Pt noted w mild edema r/l ankles. son who lives w pt provide dietary recall which reveals pt is eating 3 small meals daily w 1-2 glucernas daily. Based on recall pt is likely not meeting at least 75% of needs. Based on above pt meets criteria for severe malnutrition in the context of chronic illness.    Findings as based on:  •  Comprehensive nutrition assessment and consultation  •  Calorie counts (nutrient intake analysis)  •  Food acceptance and intake status from observations by staff  •  Follow up  •  Patient education  •  Intervention secondary to interdisciplinary rounds  •   concerns      Treatment:       1. Provide glucerna TID and gelatein 1x/day   2. Provide maximum assistance and encouragement w meals/supplements.   3. Monitor intake and document in EMR.   4. Provide MVI w minerals   5. Encourage oral supplement between meals.     The following diet has been recommended:      PROVIDER Section:     By signing this assessment you are acknowledging and agree with the diagnosis/diagnoses assigned by the Registered Dietitian    Comments: Upon Nutritional Assessment by the Registered Dietitian your patient was determined to meet criteria / has evidence of the following diagnosis/diagnoses:          [ ]  Mild Protein Calorie Malnutrition        [ ]  Moderate Protein Calorie Malnutrition        [x ] Severe Protein Calorie Malnutrition        [ ] Unspecified Protein Calorie Malnutrition        [ ] Underweight / BMI <19        [ ] Morbid Obesity / BMI > 40      Findings:    Upon observation pt appears thin and NFPE significant for muscle wasting: severe: temporal, clavicle, deltoid, scapular, thigh, and patellar regions; fat depletion: severe: occipital and triceps. Son reports # ~1 year ago and pt has lost about 20# over the past year. C/w admission wt. Wt loss is not clin sig however. Pt noted w mild edema r/l ankles. son who lives w pt provide dietary recall which reveals pt is eating 3 small meals daily w 1-2 glucernas daily. Based on recall pt is likely not meeting at least 75% of needs. Based on above pt meets criteria for severe malnutrition in the context of chronic illness.    Findings as based on:  •  Comprehensive nutrition assessment and consultation  •  Calorie counts (nutrient intake analysis)  •  Food acceptance and intake status from observations by staff  •  Follow up  •  Patient education  •  Intervention secondary to interdisciplinary rounds  •   concerns      Treatment:      1. Provide glucerna TID and gelatein 1x/day   2. Provide maximum assistance and encouragement w meals/supplements.   3. Monitor intake and document in EMR.   4. Provide MVI w minerals   5. Encourage oral supplement between meals.   6. Monitor wt weekly   7. Order a1c     The following diet has been recommended:      PROVIDER Section:     By signing this assessment you are acknowledging and agree with the diagnosis/diagnoses assigned by the Registered Dietitian    Comments:

## 2019-02-16 ENCOUNTER — TRANSCRIPTION ENCOUNTER (OUTPATIENT)
Age: 84
End: 2019-02-16

## 2019-02-16 VITALS
TEMPERATURE: 97 F | HEART RATE: 70 BPM | DIASTOLIC BLOOD PRESSURE: 69 MMHG | RESPIRATION RATE: 18 BRPM | SYSTOLIC BLOOD PRESSURE: 135 MMHG

## 2019-02-16 PROCEDURE — 99223 1ST HOSP IP/OBS HIGH 75: CPT

## 2019-02-16 RX ORDER — ACETAMINOPHEN 500 MG
2 TABLET ORAL
Qty: 0 | Refills: 0 | COMMUNITY
Start: 2019-02-16

## 2019-02-16 RX ORDER — QUETIAPINE FUMARATE 200 MG/1
1 TABLET, FILM COATED ORAL
Qty: 0 | Refills: 0 | COMMUNITY
Start: 2019-02-16

## 2019-02-16 RX ADMIN — Medication 650 MILLIGRAM(S): at 11:36

## 2019-02-16 RX ADMIN — VALACYCLOVIR 500 MILLIGRAM(S): 500 TABLET, FILM COATED ORAL at 11:36

## 2019-02-16 RX ADMIN — Medication 650 MILLIGRAM(S): at 05:57

## 2019-02-16 RX ADMIN — QUETIAPINE FUMARATE 25 MILLIGRAM(S): 200 TABLET, FILM COATED ORAL at 05:58

## 2019-02-16 RX ADMIN — PANTOPRAZOLE SODIUM 40 MILLIGRAM(S): 20 TABLET, DELAYED RELEASE ORAL at 05:58

## 2019-02-16 RX ADMIN — Medication 81 MILLIGRAM(S): at 11:36

## 2019-02-16 RX ADMIN — CARBIDOPA AND LEVODOPA 3 CAPSULE(S): 25; 100 TABLET ORAL at 11:35

## 2019-02-16 RX ADMIN — HEPARIN SODIUM 5000 UNIT(S): 5000 INJECTION INTRAVENOUS; SUBCUTANEOUS at 05:58

## 2019-02-16 RX ADMIN — CARBIDOPA AND LEVODOPA 3 CAPSULE(S): 25; 100 TABLET ORAL at 05:56

## 2019-02-16 RX ADMIN — LOSARTAN POTASSIUM 12.5 MILLIGRAM(S): 100 TABLET, FILM COATED ORAL at 05:57

## 2019-02-16 RX ADMIN — RASAGILINE 1 MILLIGRAM(S): 0.5 TABLET ORAL at 13:09

## 2019-02-16 NOTE — DISCHARGE NOTE ADULT - HOSPITAL COURSE
cc: Weakness	  History of Present Illness: 	  85M.  admitted 02/13/19.  presented to ED from home due to generalized weakness.  progressive.  increased confusion and hallucinations.   his legs gave out while ambulating with his son.  unable to care for him at home anymore and would like to discuss placement options.  in ED, UA (+) pyuria.  UCx obtained.  given levofloxacin.      2/14: Pt sitting in chair with son at bedside.  Pt poor historian and offers no specific complaints. Spoke with son who understands plan is to hydrate and treat underlying UTI as well as PT evaluation.  2/15: Pt lyring in bed, comfortable but confused.  Son at bedside concerned about his episodes of agitation.  Pt on 1:1 for safety.    2/16: Pt alert, calm, offers no complaints but largely non-verbal.  Son aware of his progressing Parkinson's dementia.  Told son he will continue to decline and will need long term care and goals of care discussion.  Pt is weak but HD stable.  Urine cultures negative.  Pt received 3 days of IV antibiotics and also received IV fluid hydration.    ROS:  unobtainable due to dementia.    Physical Exam:  Vital Signs Last 24 Hrs T(C): 36.2 (16 Feb 2019 10:59), Max: 36.2 (16 Feb 2019 05:21) T(F): 97.2 (16 Feb 2019 10:59), Max: 97.2 (16 Feb 2019 05:21) HR: 70 (16 Feb 2019 10:59) (70 - 71) BP: 135/69 (16 Feb 2019 10:59) (135/69 - 150/69) RR: 18 (16 Feb 2019 10:59) (18 - 18) SpO2: 99% (16 Feb 2019 05:21) (99% - 99%)  Constitutional: confused, frail, elderly, thin  HEENT: PERR, EOMI, Normal Hearing, MMM  Neck: Soft and supple, No LAD, No JVD  Respiratory: Breath sounds are clear bilaterally, No wheezing, rales or rhonchi  Cardiovascular: S1 and S2, regular rate and rhythm.  Gastrointestinal: Bowel Sounds present, soft, nontender, nondistended, no guarding, no rebound  Extremities: No peripheral edema  Vascular: 2+ peripheral pulses  Neurological: A/O x 0, no focal deficits Skin: No rashes	      meds/labs: Reviewed       Assessment and Plan:  85M.  admitted 02/13/19.  presented to ED from home due to generalized weakness.     UTI with acute metabolic encephalopathy in setting of dementia vs progression of disease:  -ceftriaxone day # 3, stop further antibiotics given negative urine cultures.  -supportive care  -Seroquel standing  -neuro eval appreciated.     advanced PD w/ progressive debility and dementia.  -c/w home meds  -CT head no acute pathology  -nutrition eval for malnutrition  -supportive care  -PT    Dehydration, LIZBETH: resolving with IVFs.  Encourage oral hydration.    anemia: stable    d/c ANTON.    Attending Statement: 40 minutes spent on total encounter and discharge planning.

## 2019-02-16 NOTE — DISCHARGE NOTE ADULT - MEDICATION SUMMARY - MEDICATIONS TO TAKE
I will START or STAY ON the medications listed below when I get home from the hospital:    Aspir 81 oral delayed release tablet  -- 1 tab(s) by mouth 4 times a week [Tuesday, Thursday, Saturday, Sunday]  -- Indication: For resume home med    acetaminophen 325 mg oral tablet  -- 2 tab(s) by mouth every 6 hours  -- Indication: For pain    losartan 25 mg oral tablet  -- 0.5 tab(s) by mouth every other day  -- Indication: For resume home med    tamsulosin 0.4 mg oral capsule  -- 1 cap(s) by mouth once a day (in the evening)  -- Indication: For resume home med    mirtazapine 30 mg oral tablet  -- 1 tab(s) by mouth once a day (at bedtime)  -- Indication: For resume home med    simvastatin 20 mg oral tablet  -- 1 tab(s) by mouth once a day (at bedtime)  -- Indication: For resume home med    Rytary 36.25 mg-145 mg oral capsule, extended release  -- 3 cap(s) by mouth 3 times a day [0700, 1100, 1500] in addition to 2 caps by mouth once daily at 1900  -- Indication: For resume home med    Rytary 36.25 mg-145 mg oral capsule, extended release  -- 2 cap(s) by mouth once a day at 1900  -- Indication: For resume home med    rasagiline 1 mg oral tablet  -- 1 tab(s) by mouth once a day  -- Indication: For resume home med    QUEtiapine 25 mg oral tablet  -- 1 tab(s) by mouth 2 times a day  -- Indication: For Agitation    valACYclovir 500 mg oral tablet  -- 1 tab(s) by mouth once a day (in the evening)  -- Indication: For resume home med    donepezil 5 mg oral tablet  -- 1 tab(s) by mouth once a day  -- Indication: For resume home med    docusate sodium 100 mg oral capsule  -- 1 cap(s) by mouth 3 times a day  -- Indication: For resume home med    latanoprost 0.005% ophthalmic solution  -- 1 drop(s) in the right eye once a day (in the evening)  -- Indication: For resume home med    omeprazole 40 mg oral delayed release capsule  -- 1 cap(s) by mouth once a day (in the evening)  -- Indication: For resume home med    Vitamin D3 1000 intl units oral tablet  -- 1 tab(s) by mouth once a day  -- Indication: For resume home med

## 2019-02-16 NOTE — CONSULT NOTE ADULT - SUBJECTIVE AND OBJECTIVE BOX
Patient is a 85y old  Male who presents with a chief complaint of Patient is a 85y old  Male who presents with worsening sx    HPI:  85M.  admitted 02/13/19.  Presented to ED from home due to generalized weakness.  progressive  increased confusion and hallucinations.  As per son at bed side  legs gave out while ambulating with his son.  unable  to care for him at home anymore and would like to discuss placement options.  in ED, UA (+) pyuria.  UCx obtained.  given levofloxacin. Being followed by Dr. Horn Neurologist and Dr Wilson Psychiatrist  out side for his problems currently taking Rytary and tried on Seroquel and NUplazid for Hallucinations did not help him.     ROS:  unobtainable due to dementia.    PMHx:  DM;  HTN;  HLD;  BPH;  renal CA;  PD;  dementia;  anxiety/depression.    PSHx:  b/l cataracts;  nephrectomy;  inguinal hernia repair.    ALL:  morphine.    Rx:  reviewed.    SocHx:  lives in the community with his family.  no tobacco, EtOH or illegal drugs.    FHx:  as per son no pertinent family hx of cardiac or neurological problems. (13 Feb 2019 21:36)      PAST MEDICAL & SURGICAL HISTORY:  Depression  Anxiety  BPH (benign prostatic hyperplasia)  Falls frequently  GERD (gastroesophageal reflux disease)  HTN (hypertension)  Diabetes  Hyperlipidemia  Parkinson Disease  Adjustment Reaction with Anxiety and Depression  Enlarged Prostate  Bilateral Cataracts  History of Nephrectomy, Unilateral  S/P Inguinal Hernia Repair      FAMILY HISTORY:  No pertinent family history in first degree relatives      Social Hx:  Nonsmoker, no drug or alcohol use    MEDICATIONS  (STANDING):  acetaminophen   Tablet .. 650 milliGRAM(s) Oral every 6 hours  aspirin enteric coated 81 milliGRAM(s) Oral <User Schedule>  carbidopa 36.25 mG/levodopa 145 mG ER 3 Capsule(s) Oral <User Schedule>  carbidopa 36.25 mG/levodopa 145 mG ER 2 Capsule(s) Oral <User Schedule>  docusate sodium 100 milliGRAM(s) Oral three times a day  donepezil 5 milliGRAM(s) Oral at bedtime  heparin  Injectable 5000 Unit(s) SubCutaneous every 8 hours  latanoprost 0.005% Ophthalmic Solution 1 Drop(s) Right EYE at bedtime  losartan 12.5 milliGRAM(s) Oral daily  mirtazapine 30 milliGRAM(s) Oral at bedtime  pantoprazole    Tablet 40 milliGRAM(s) Oral before breakfast  QUEtiapine 25 milliGRAM(s) Oral two times a day  rasagiline Tablet 1 milliGRAM(s) Oral daily  simvastatin 20 milliGRAM(s) Oral at bedtime  tamsulosin 0.4 milliGRAM(s) Oral at bedtime  valACYclovir 500 milliGRAM(s) Oral daily       Allergies    morphine (Unknown)    Intolerances    risperidone (Unknown)  Seroquel (Unknown)      ROS: Pertinent positives in HPI, all other ROS were reviewed and are negative.      Vital Signs Last 24 Hrs  T(C): 36.2 (16 Feb 2019 10:59), Max: 37 (15 Feb 2019 12:28)  T(F): 97.2 (16 Feb 2019 10:59), Max: 98.6 (15 Feb 2019 12:28)  HR: 70 (16 Feb 2019 10:59) (70 - 88)  BP: 135/69 (16 Feb 2019 10:59) (135/69 - 150/69)  BP(mean): --  RR: 18 (16 Feb 2019 10:59) (18 - 20)  SpO2: 99% (16 Feb 2019 05:21) (98% - 99%)    Constitutional: awake and alert not coop[erative for exam .  HEENT: PERRLA, EOMI,   Neck: Supple.  Respiratory: Breath sounds are clear bilaterally  Cardiovascular: S1 and S2, regular  rhythm  Gastrointestinal: soft, nontender  Extremities:  no edema  Vascular: Carotid Bruit - no  Musculoskeletal: no joint swelling/tenderness, no abnormal movements  Skin: No rashes    Neurological exam:  HF: A x O x 1, but not to place or events  not cooperative for exam. Speech  hypophonia slow  CN: ESTEPHANIE, EOMI, VFF, facial sensation normal, no NLFD, tongue midline, gag down.  Motor: Bilateral cog wheel rigidity left more than right, can not test power pt not cooperative.   Sens: Not tested    Reflexes: Symmetric  +1 to 0  downgoing toes b/l  Coord:  Not able to test  Gait/Balance: Cannot test        Labs:   02-14    142  |  107  |  33<H>  ----------------------------<  154<H>  4.4   |  29  |  1.29    Ca    8.8      14 Feb 2019 12:56          Radiology:  - CT Head:    < from: CT Head No Cont (02.13.19 @ 17:48) >  Impression: No gross acute intracranial pathology on this motion degraded   study. Follow-up as clinically indicated.

## 2019-02-16 NOTE — CONSULT NOTE ADULT - ASSESSMENT
85M.  admitted 02/13/19.  presented to ED from home due to generalized weakness.  progressive.  increased confusion and hallucinations.     Advanced PD with underlying Dementia and Failure to thrive.    Discussed with Pt's son at bed side who states that they can not handle him at home and looking for long term care.  Need to discuss Long term issues of Hydration/ feedings etc or palliative care.  Discussed with Dr. Wade.  PT/OT if Pt cooperative.  DVt prophylaxis.  continue present  Rytary .

## 2019-02-16 NOTE — DISCHARGE NOTE ADULT - PATIENT PORTAL LINK FT
You can access the [email or web address] Patient Portal, offered by [facility], by registering with the following website: [email or web address]

## 2019-02-16 NOTE — DISCHARGE NOTE ADULT - CARE PLAN
Principal Discharge DX:	Dementia  Goal:	supportive care  Assessment and plan of treatment:	Continue med management and supportive care.  Secondary Diagnosis:	Parkinson disease  Goal:	supportive care.  Assessment and plan of treatment:	Continue med management and supportive care.  Secondary Diagnosis:	Malnutrition  Goal:	supportive care.  Assessment and plan of treatment:	Pt with poor nutrition secondary to Parkinsons dementia.  Continue supportive care.  sub acute rehab  Will ultimately need skilled nursing facility and goals of care discussion.

## 2019-02-16 NOTE — DISCHARGE NOTE ADULT - PLAN OF CARE
supportive care Continue med management and supportive care. supportive care. Pt with poor nutrition secondary to Parkinsons dementia.  Continue supportive care.  sub acute rehab  Will ultimately need skilled nursing facility and goals of care discussion.

## 2019-02-21 DIAGNOSIS — E78.5 HYPERLIPIDEMIA, UNSPECIFIED: ICD-10-CM

## 2019-02-21 DIAGNOSIS — Z88.5 ALLERGY STATUS TO NARCOTIC AGENT: ICD-10-CM

## 2019-02-21 DIAGNOSIS — E86.0 DEHYDRATION: ICD-10-CM

## 2019-02-21 DIAGNOSIS — F41.9 ANXIETY DISORDER, UNSPECIFIED: ICD-10-CM

## 2019-02-21 DIAGNOSIS — F32.9 MAJOR DEPRESSIVE DISORDER, SINGLE EPISODE, UNSPECIFIED: ICD-10-CM

## 2019-02-21 DIAGNOSIS — G20 PARKINSON'S DISEASE: ICD-10-CM

## 2019-02-21 DIAGNOSIS — R53.1 WEAKNESS: ICD-10-CM

## 2019-02-21 DIAGNOSIS — Z79.899 OTHER LONG TERM (CURRENT) DRUG THERAPY: ICD-10-CM

## 2019-02-21 DIAGNOSIS — N39.0 URINARY TRACT INFECTION, SITE NOT SPECIFIED: ICD-10-CM

## 2019-02-21 DIAGNOSIS — I10 ESSENTIAL (PRIMARY) HYPERTENSION: ICD-10-CM

## 2019-02-21 DIAGNOSIS — D64.9 ANEMIA, UNSPECIFIED: ICD-10-CM

## 2019-02-21 DIAGNOSIS — F02.80 DEMENTIA IN OTHER DISEASES CLASSIFIED ELSEWHERE, UNSPECIFIED SEVERITY, WITHOUT BEHAVIORAL DISTURBANCE, PSYCHOTIC DISTURBANCE, MOOD DISTURBANCE, AND ANXIETY: ICD-10-CM

## 2019-02-21 DIAGNOSIS — Z79.82 LONG TERM (CURRENT) USE OF ASPIRIN: ICD-10-CM

## 2019-02-21 DIAGNOSIS — N40.0 BENIGN PROSTATIC HYPERPLASIA WITHOUT LOWER URINARY TRACT SYMPTOMS: ICD-10-CM

## 2019-02-21 DIAGNOSIS — E11.9 TYPE 2 DIABETES MELLITUS WITHOUT COMPLICATIONS: ICD-10-CM

## 2019-02-21 DIAGNOSIS — E43 UNSPECIFIED SEVERE PROTEIN-CALORIE MALNUTRITION: ICD-10-CM

## 2019-02-21 DIAGNOSIS — G93.41 METABOLIC ENCEPHALOPATHY: ICD-10-CM

## 2019-03-29 NOTE — ED ADULT NURSE NOTE - CCCP TRG CHIEF CMPLNT
Pt AAOX4 Pt sleepy but easily aroused. Pt remained stable during pacu stay. VSS.  Received IV and po pain medication. No N&V. Received Zofran 4mg IV x1 Tolerated sips of water and pill intake. Incisions to abdomen intact with dermabond. Teds/SCD's in place throughout duration in PACU. Transferred to the next Phase of Care.   see chief complaint quote

## 2019-04-27 NOTE — ED ADULT NURSE NOTE - NSFALLRSKOUTCOME_ED_ALL_ED
Pt is meeting all surgical goals. His pain is well controlled. Incision intact, without S&S of infection. Bowels active time X4 , LCTAB He is afebrile and wanting to go home. Emotional support provided. He wants to do the things he enjoys, as he doesn't know how many days he has left. Encouraged him to keep expressing his feelings and to speak with the doctor.   Fall with Harm Risk

## 2020-09-02 NOTE — ED ADULT TRIAGE NOTE - BP NONINVASIVE SYSTOLIC (MM HG)
IV infilitrated. New IV started, second trop collected and sent, and Covid swab obtained and sent. No other needs at this time.    166

## 2020-10-14 NOTE — DISCHARGE NOTE ADULT - NS MD DC FALL RISK RISK
Pt has been informed of lab results    For information on Fall & Injury Prevention, visit [email or web address]

## 2021-12-07 NOTE — ED ADULT NURSE NOTE - CAS EDN DISCHARGE ASSESSMENT
This patient is coming in Thursday for labs for you, we have no orders, please advise/enter orders, thank you.  
Symptoms improved/Alert and oriented to person, place and time/Patient baseline mental status/Awake

## 2022-05-24 NOTE — ED ADULT TRIAGE NOTE - TEMPERATURE IN FAHRENHEIT (DEGREES F)
97.8 Acitretin Counseling:  I discussed with the patient the risks of acitretin including but not limited to hair loss, dry lips/skin/eyes, liver damage, hyperlipidemia, depression/suicidal ideation, photosensitivity.  Serious rare side effects can include but are not limited to pancreatitis, pseudotumor cerebri, bony changes, clot formation/stroke/heart attack.  Patient understands that alcohol is contraindicated since it can result in liver toxicity and significantly prolong the elimination of the drug by many years.

## 2022-05-27 NOTE — DISCHARGE NOTE ADULT - VISION (WITH CORRECTIVE LENSES IF THE PATIENT USUALLY WEARS THEM):
Partially impaired: cannot see medication labels or newsprint, but can see obstacles in path, and the surrounding layout; can count fingers at arm's length rolling walker

## 2022-11-11 NOTE — ED ADULT TRIAGE NOTE - NSWEIGHTCALCTOOLDRUG_GEN_A_CORE
Mother calling with .   Patient is away at college but mother wants to know if Tala can be tested for lipoprotein A when she has her annual visit with Dasha Sepulveda during college winter break.    Says she and her mother (patient's grandmother) have elevated lipoprotein A and the [facility], who is seeing mom, recommended the kids get tested.    Added note to upcoming physical.    Yessy Easton RN  [facility]      
 used

## 2023-04-16 NOTE — PATIENT PROFILE ADULT - BRADEN ACTIVITY
bilateral upper extremity Active ROM was WFL (within functional limits)/bilateral  lower extremity Active ROM was WFL (within functional limits) (1) bedfast

## 2023-10-27 NOTE — PROGRESS NOTE ADULT - REASON FOR ADMISSION
Patient is a 75-year-old female with PMH significant for A-fib (on Xarelto, history of bifascicular block), CKD 4 (baseline CR 1.5-1.9), IDDM-2, HLD, HTN,  history of MV  endocarditis (group B strep), lacunar CVA history of cerebral aneurysm and meningioma. Patient presented to the ED on 10/22/2023 for increasing bouts of diarrhea along with diffuse rash. She was also found to be be in acute renal failure with Cr of 5.32, from baseline 1.5. She was admitted for further workup. Patient given IVF and sodium bicarb with slow improvement in her kidney functions. C. Diff panel negative. Dermatology tele-consult, completed 5 day course of prednisone 40 mg with gradual improvement in the rash. ID consulted, believed rash likely 2/2 allergic reaction to her amoxicillin. All antibiotics discontinued. Aldolase levels elevated, NORMAN negative, IgE elevated, flow cytometry unrevealing, recommend correlating with workup outpatient if deemed necessary. Patient will be discharged with sodium bicarb 650 mg PO TID and close follow up with nephrology (plan to have an appointment with her nephrologist and PCP in 7 days from discharge), patient is supposed to have BMP done within 7 days/prior to her PCP and nephrologist appointment.  Her Xarelto dose changed to 10 mg daily, patient is to continue sodium bicarb 650 mg twice daily until seen by her nephrologist, she is supposed to stop her diuretics until diarrhea resolves (indication to restart diuretics would be up to the discretion of her PCP and nephrology).  Patient is qualified for and accepted to SNF.   
Patient is a 85y old  Male who presents with a chief complaint of
weakness

## 2024-08-02 NOTE — ED PROVIDER NOTE - RESPIRATORY, MLM
Problem: Pain  Goal: Acceptable pain level achieved/maintained at rest using appropriate pain scale for the patient  Outcome: Monitoring/Evaluating progress  Goal: Acceptable pain level achieved/maintained with activity using appropriate pain scale for the patient  Outcome: Monitoring/Evaluating progress  Goal: Acceptable pain level achieved/maintained without oversedation  Outcome: Monitoring/Evaluating progress      Breath sounds clear and equal bilaterally.

## 2024-09-07 NOTE — ED ADULT NURSE NOTE - CHPI ED NUR DURATION
1) Follow up with your doctor in 1-2 days  2) Return to the ER for worsening or concerning symptoms        Patient Name: ALINA PEREZ  Caregiver: Alina Perez  Cough, Adult    Coughing is a reflex that clears your throat and your airways (respiratory system). Coughing helps to heal and protect your lungs. It is normal to cough occasionally, but a cough that happens with other symptoms or lasts a long time may be a sign of a condition that needs treatment. An acute cough may only last 2–3 weeks, while a chronic cough may last 8 or more weeks.    Coughing is commonly caused by:    Infection of the respiratory systemby viruses or bacteria.  Breathing in substances that irritate your lungs.  Allergies.  Asthma.  Mucus that runs down the back of your throat (postnasal drip).  Smoking.  Acid backing up from the stomach into the esophagus (gastroesophageal reflux).  Certain medicines.  Chronic lung problems.  Other medical conditions such as heart failure or a blood clot in the lung (pulmonary embolism).    Follow these instructions at home:      Medicines    Take over-the-counter and prescription medicines only as told by your health care provider.  Talk with your health care provider before you take a cough suppressant medicine.        Lifestyle     Avoid cigarette smoke. Do not use any products that contain nicotine or tobacco, such as cigarettes, e-cigarettes, and chewing tobacco. If you need help quitting, ask your health care provider.  Drink enough fluid to keep your urine pale yellow.  Avoid caffeine.  Do not drink alcohol if your health care provider tells you not to drink.        General instructions     Pay close attention to changes in your cough. Tell your health care provider about them.  Always cover your mouth when you cough.  Avoid things that make you cough, such as perfume, candles, cleaning products, or campfire or tobacco smoke.  If the air is dry, use a cool mist vaporizer or humidifier in your bedroom or your home to help loosen secretions.  If your cough is worse at night, try to sleep in a semi-upright position.  Rest as needed.  Keep all follow-up visits as told by your health care provider. This is important.    Contact a health care provider if you:  Have new symptoms.  Cough up pus.  Have a cough that does not get better after 2–3 weeks or gets worse.  Cannot control your cough with cough suppressant medicines and you are losing sleep.  Have pain that gets worse or pain that is not helped with medicine.  Have a fever.  Have unexplained weight loss.  Have night sweats.    Get help right away if:  You cough up blood.  You have difficulty breathing.  Your heartbeat is very fast.    These symptoms may represent a serious problem that is an emergency. Do not wait to see if the symptoms will go away. Get medical help right away. Call your local emergency services (911 in the [place] Do not drive yourself to the hospital.    Summary  Coughing is a reflex that clears your throat and your airways. It is normal to cough occasionally, but a cough that happens with other symptoms or lasts a long time may be a sign of a condition that needs treatment.  Take over-the-counter and prescription medicines only as told by your health care provider.  Always cover your mouth when you cough.  Contact a health care provider if you have new symptoms or a cough that does not get better after 2–3 weeks or gets worse.    ADDITIONAL NOTES AND INSTRUCTIONS    Please follow up with your Primary MD in 24-48 hr.  Seek immediate medical care for any new/worsening signs or symptoms.     Document Released: 6/15/2012 Document Revised: 1/6/2020 Document Reviewed: 1/6/2020  [employer] Patient Education ©2019 [employer] This information is not intended to replace advice given to you by your health care provider. Make sure you discuss any questions you have with your health care provider. today

## 2024-11-19 NOTE — ED PROVIDER NOTE - NS ED ATTENDING STATEMENT MOD
Detail Level: Zone
Add 26164 Cpt? (Important Note: In 2017 The Use Of 97997 Is Being Tracked By Cms To Determine Future Global Period Reimbursement For Global Periods): yes
Wound Evaluated By: Dr. Cruz
Attending Only

## 2025-02-03 NOTE — ED ADULT NURSE NOTE - NSFALLRSKHRMRISKTYPE_ED_ALL_ED
No protocol for requested medication.    Medication: Hydroxyzine  Last office visit date: 12/5/24  Pharmacy: [id number] - [place], [place] - [address]    Order pended, routed to clinician for review.        age(85 years old or older)